# Patient Record
Sex: MALE | Race: BLACK OR AFRICAN AMERICAN | Employment: OTHER | URBAN - METROPOLITAN AREA
[De-identification: names, ages, dates, MRNs, and addresses within clinical notes are randomized per-mention and may not be internally consistent; named-entity substitution may affect disease eponyms.]

---

## 2019-01-03 ENCOUNTER — HOSPITAL ENCOUNTER (OUTPATIENT)
Dept: GENERAL RADIOLOGY | Age: 84
Discharge: HOME | End: 2019-01-03
Payer: MEDICARE

## 2019-01-03 DIAGNOSIS — K59.00 CONSTIPATION: ICD-10-CM

## 2019-01-03 PROCEDURE — 74022 RADEX COMPL AQT ABD SERIES: CPT

## 2021-11-04 ENCOUNTER — HOSPITAL ENCOUNTER (EMERGENCY)
Age: 86
Discharge: HOME | End: 2021-11-04
Attending: EMERGENCY MEDICINE
Payer: MEDICARE

## 2021-11-04 ENCOUNTER — APPOINTMENT (OUTPATIENT)
Dept: CT IMAGING | Age: 86
End: 2021-11-04
Attending: STUDENT IN AN ORGANIZED HEALTH CARE EDUCATION/TRAINING PROGRAM
Payer: MEDICARE

## 2021-11-04 VITALS
RESPIRATION RATE: 16 BRPM | BODY MASS INDEX: 30.62 KG/M2 | HEART RATE: 62 BPM | SYSTOLIC BLOOD PRESSURE: 153 MMHG | OXYGEN SATURATION: 96 % | HEIGHT: 67 IN | WEIGHT: 195.11 LBS | TEMPERATURE: 97.3 F | DIASTOLIC BLOOD PRESSURE: 83 MMHG

## 2021-11-04 DIAGNOSIS — F01.50 VASCULAR DEMENTIA WITHOUT BEHAVIORAL DISTURBANCE (HCC): Primary | ICD-10-CM

## 2021-11-04 LAB
ALBUMIN SERPL-MCNC: 3.9 G/DL (ref 3.5–5)
ALBUMIN/GLOB SERPL: 0.9 {RATIO} (ref 1.1–2.2)
ALP SERPL-CCNC: 73 U/L (ref 45–117)
ALT SERPL-CCNC: 24 U/L (ref 12–78)
ANION GAP SERPL CALC-SCNC: 4 MMOL/L (ref 5–15)
APPEARANCE UR: CLEAR
AST SERPL-CCNC: 19 U/L (ref 15–37)
ATRIAL RATE: 56 BPM
BACTERIA URNS QL MICRO: NEGATIVE /HPF
BASOPHILS # BLD: 0 K/UL (ref 0–0.1)
BASOPHILS NFR BLD: 1 % (ref 0–1)
BILIRUB SERPL-MCNC: 1.2 MG/DL (ref 0.2–1)
BILIRUB UR QL: NEGATIVE
BUN SERPL-MCNC: 15 MG/DL (ref 6–20)
BUN/CREAT SERPL: 10 (ref 12–20)
CALCIUM SERPL-MCNC: 9.9 MG/DL (ref 8.5–10.1)
CALCULATED P AXIS, ECG09: 38 DEGREES
CALCULATED R AXIS, ECG10: -11 DEGREES
CALCULATED T AXIS, ECG11: -14 DEGREES
CHLORIDE SERPL-SCNC: 107 MMOL/L (ref 97–108)
CO2 SERPL-SCNC: 28 MMOL/L (ref 21–32)
COLOR UR: NORMAL
COMMENT, HOLDF: NORMAL
CREAT SERPL-MCNC: 1.44 MG/DL (ref 0.7–1.3)
DIAGNOSIS, 93000: NORMAL
DIFFERENTIAL METHOD BLD: ABNORMAL
EOSINOPHIL # BLD: 0.1 K/UL (ref 0–0.4)
EOSINOPHIL NFR BLD: 2 % (ref 0–7)
EPITH CASTS URNS QL MICRO: NORMAL /LPF
ERYTHROCYTE [DISTWIDTH] IN BLOOD BY AUTOMATED COUNT: 12.9 % (ref 11.5–14.5)
GLOBULIN SER CALC-MCNC: 4.4 G/DL (ref 2–4)
GLUCOSE SERPL-MCNC: 80 MG/DL (ref 65–100)
GLUCOSE UR STRIP.AUTO-MCNC: NEGATIVE MG/DL
HCT VFR BLD AUTO: 47 % (ref 36.6–50.3)
HGB BLD-MCNC: 15.8 G/DL (ref 12.1–17)
HGB UR QL STRIP: NEGATIVE
IMM GRANULOCYTES # BLD AUTO: 0 K/UL (ref 0–0.04)
IMM GRANULOCYTES NFR BLD AUTO: 0 % (ref 0–0.5)
KETONES UR QL STRIP.AUTO: NEGATIVE MG/DL
LEUKOCYTE ESTERASE UR QL STRIP.AUTO: NEGATIVE
LYMPHOCYTES # BLD: 0.9 K/UL (ref 0.8–3.5)
LYMPHOCYTES NFR BLD: 26 % (ref 12–49)
MCH RBC QN AUTO: 31 PG (ref 26–34)
MCHC RBC AUTO-ENTMCNC: 33.6 G/DL (ref 30–36.5)
MCV RBC AUTO: 92.2 FL (ref 80–99)
MONOCYTES # BLD: 0.6 K/UL (ref 0–1)
MONOCYTES NFR BLD: 17 % (ref 5–13)
NEUTS SEG # BLD: 1.9 K/UL (ref 1.8–8)
NEUTS SEG NFR BLD: 54 % (ref 32–75)
NITRITE UR QL STRIP.AUTO: NEGATIVE
NRBC # BLD: 0 K/UL (ref 0–0.01)
NRBC BLD-RTO: 0 PER 100 WBC
P-R INTERVAL, ECG05: 174 MS
PH UR STRIP: 6 [PH] (ref 5–8)
PLATELET # BLD AUTO: 167 K/UL (ref 150–400)
PMV BLD AUTO: 11.9 FL (ref 8.9–12.9)
POTASSIUM SERPL-SCNC: 4 MMOL/L (ref 3.5–5.1)
PROT SERPL-MCNC: 8.3 G/DL (ref 6.4–8.2)
PROT UR STRIP-MCNC: NEGATIVE MG/DL
Q-T INTERVAL, ECG07: 406 MS
QRS DURATION, ECG06: 76 MS
QTC CALCULATION (BEZET), ECG08: 391 MS
RBC # BLD AUTO: 5.1 M/UL (ref 4.1–5.7)
RBC #/AREA URNS HPF: NORMAL /HPF (ref 0–5)
SAMPLES BEING HELD,HOLD: NORMAL
SODIUM SERPL-SCNC: 139 MMOL/L (ref 136–145)
SP GR UR REFRACTOMETRY: <1.005 (ref 1–1.03)
TROPONIN-HIGH SENSITIVITY: 9 NG/L (ref 0–76)
UA: UC IF INDICATED,UAUC: NORMAL
UROBILINOGEN UR QL STRIP.AUTO: 0.2 EU/DL (ref 0.2–1)
VENTRICULAR RATE, ECG03: 56 BPM
WBC # BLD AUTO: 3.5 K/UL (ref 4.1–11.1)
WBC URNS QL MICRO: NORMAL /HPF (ref 0–4)

## 2021-11-04 PROCEDURE — 99283 EMERGENCY DEPT VISIT LOW MDM: CPT

## 2021-11-04 PROCEDURE — 93005 ELECTROCARDIOGRAM TRACING: CPT

## 2021-11-04 PROCEDURE — 70450 CT HEAD/BRAIN W/O DYE: CPT

## 2021-11-04 PROCEDURE — 84484 ASSAY OF TROPONIN QUANT: CPT

## 2021-11-04 PROCEDURE — 80053 COMPREHEN METABOLIC PANEL: CPT

## 2021-11-04 PROCEDURE — 36415 COLL VENOUS BLD VENIPUNCTURE: CPT

## 2021-11-04 PROCEDURE — 81001 URINALYSIS AUTO W/SCOPE: CPT

## 2021-11-04 PROCEDURE — 85025 COMPLETE CBC W/AUTO DIFF WBC: CPT

## 2021-11-04 NOTE — DISCHARGE INSTRUCTIONS
Please follow up with your primary care doctor. We also recommend follow up with Neurology. Please see attached instructions for more information.
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
## 2021-12-28 ENCOUNTER — APPOINTMENT (OUTPATIENT)
Dept: GENERAL RADIOLOGY | Age: 86
DRG: 445 | End: 2021-12-28
Attending: PHYSICIAN ASSISTANT
Payer: MEDICARE

## 2021-12-28 ENCOUNTER — HOSPITAL ENCOUNTER (INPATIENT)
Age: 86
LOS: 3 days | Discharge: HOME | DRG: 445 | End: 2022-01-01
Attending: EMERGENCY MEDICINE | Admitting: INTERNAL MEDICINE
Payer: MEDICARE

## 2021-12-28 ENCOUNTER — APPOINTMENT (OUTPATIENT)
Dept: CT IMAGING | Age: 86
DRG: 445 | End: 2021-12-28
Attending: PHYSICIAN ASSISTANT
Payer: MEDICARE

## 2021-12-28 DIAGNOSIS — R78.81 BACTEREMIA DUE TO GRAM-NEGATIVE BACTERIA: ICD-10-CM

## 2021-12-28 DIAGNOSIS — B17.9 ACUTE HEPATITIS: ICD-10-CM

## 2021-12-28 DIAGNOSIS — E80.6 HYPERBILIRUBINEMIA: Primary | ICD-10-CM

## 2021-12-28 DIAGNOSIS — R50.9 FEVER, UNSPECIFIED FEVER CAUSE: ICD-10-CM

## 2021-12-28 DIAGNOSIS — R77.8 ELEVATED TROPONIN: ICD-10-CM

## 2021-12-28 LAB
ALBUMIN SERPL-MCNC: 3.5 G/DL (ref 3.5–5)
ALBUMIN/GLOB SERPL: 0.9 {RATIO} (ref 1.1–2.2)
ALP SERPL-CCNC: 127 U/L (ref 45–117)
ALT SERPL-CCNC: 734 U/L (ref 12–78)
ANION GAP SERPL CALC-SCNC: 6 MMOL/L (ref 5–15)
AST SERPL-CCNC: 666 U/L (ref 15–37)
BILIRUB SERPL-MCNC: 4.1 MG/DL (ref 0.2–1)
BUN SERPL-MCNC: 19 MG/DL (ref 6–20)
BUN/CREAT SERPL: 15 (ref 12–20)
CALCIUM SERPL-MCNC: 9.1 MG/DL (ref 8.5–10.1)
CHLORIDE SERPL-SCNC: 105 MMOL/L (ref 97–108)
CO2 SERPL-SCNC: 27 MMOL/L (ref 21–32)
COMMENT, HOLDF: NORMAL
COMMENT, HOLDF: NORMAL
CREAT SERPL-MCNC: 1.3 MG/DL (ref 0.7–1.3)
GLOBULIN SER CALC-MCNC: 3.8 G/DL (ref 2–4)
GLUCOSE SERPL-MCNC: 108 MG/DL (ref 65–100)
LACTATE SERPL-SCNC: 0.8 MMOL/L (ref 0.4–2)
POTASSIUM SERPL-SCNC: 4.2 MMOL/L (ref 3.5–5.1)
PROT SERPL-MCNC: 7.3 G/DL (ref 6.4–8.2)
SAMPLES BEING HELD,HOLD: NORMAL
SAMPLES BEING HELD,HOLD: NORMAL
SODIUM SERPL-SCNC: 138 MMOL/L (ref 136–145)

## 2021-12-28 PROCEDURE — 87186 SC STD MICRODIL/AGAR DIL: CPT

## 2021-12-28 PROCEDURE — 83605 ASSAY OF LACTIC ACID: CPT

## 2021-12-28 PROCEDURE — 81003 URINALYSIS AUTO W/O SCOPE: CPT

## 2021-12-28 PROCEDURE — 74177 CT ABD & PELVIS W/CONTRAST: CPT

## 2021-12-28 PROCEDURE — 80053 COMPREHEN METABOLIC PANEL: CPT

## 2021-12-28 PROCEDURE — 71046 X-RAY EXAM CHEST 2 VIEWS: CPT

## 2021-12-28 PROCEDURE — 87077 CULTURE AEROBIC IDENTIFY: CPT

## 2021-12-28 PROCEDURE — 36415 COLL VENOUS BLD VENIPUNCTURE: CPT

## 2021-12-28 PROCEDURE — 85025 COMPLETE CBC W/AUTO DIFF WBC: CPT

## 2021-12-28 PROCEDURE — 81001 URINALYSIS AUTO W/SCOPE: CPT

## 2021-12-28 PROCEDURE — U0005 INFEC AGEN DETEC AMPLI PROBE: HCPCS

## 2021-12-28 PROCEDURE — 87040 BLOOD CULTURE FOR BACTERIA: CPT

## 2021-12-28 PROCEDURE — 99284 EMERGENCY DEPT VISIT MOD MDM: CPT

## 2021-12-29 ENCOUNTER — APPOINTMENT (OUTPATIENT)
Dept: VASCULAR SURGERY | Age: 86
DRG: 445 | End: 2021-12-29
Attending: INTERNAL MEDICINE
Payer: MEDICARE

## 2021-12-29 ENCOUNTER — APPOINTMENT (OUTPATIENT)
Dept: GENERAL RADIOLOGY | Age: 86
DRG: 445 | End: 2021-12-29
Attending: INTERNAL MEDICINE
Payer: MEDICARE

## 2021-12-29 ENCOUNTER — APPOINTMENT (OUTPATIENT)
Dept: ULTRASOUND IMAGING | Age: 86
DRG: 445 | End: 2021-12-29
Attending: INTERNAL MEDICINE
Payer: MEDICARE

## 2021-12-29 PROBLEM — B17.9 ACUTE HEPATITIS: Status: ACTIVE | Noted: 2021-12-29

## 2021-12-29 LAB
ALBUMIN SERPL-MCNC: 3.1 G/DL (ref 3.5–5)
ALBUMIN/GLOB SERPL: 0.8 {RATIO} (ref 1.1–2.2)
ALP SERPL-CCNC: 102 U/L (ref 45–117)
ALT SERPL-CCNC: 486 U/L (ref 12–78)
ANION GAP SERPL CALC-SCNC: 9 MMOL/L (ref 5–15)
APAP SERPL-MCNC: <2 UG/ML (ref 10–30)
APPEARANCE UR: CLEAR
AST SERPL-CCNC: 286 U/L (ref 15–37)
AST SERPL-CCNC: ABNORMAL U/L (ref 15–37)
BACTERIA URNS QL MICRO: NEGATIVE /HPF
BASOPHILS # BLD: 0 K/UL (ref 0–0.1)
BASOPHILS # BLD: 0 K/UL (ref 0–0.1)
BASOPHILS NFR BLD: 0 % (ref 0–1)
BASOPHILS NFR BLD: 0 % (ref 0–1)
BILIRUB SERPL-MCNC: 5.2 MG/DL (ref 0.2–1)
BILIRUB UR QL CFM: POSITIVE
BNP SERPL-MCNC: 1314 PG/ML
BUN SERPL-MCNC: 18 MG/DL (ref 6–20)
BUN/CREAT SERPL: 13 (ref 12–20)
CALCIUM SERPL-MCNC: 8.8 MG/DL (ref 8.5–10.1)
CHLORIDE SERPL-SCNC: 103 MMOL/L (ref 97–108)
CO2 SERPL-SCNC: 23 MMOL/L (ref 21–32)
COLOR UR: ABNORMAL
COVID-19 RAPID TEST, COVR: NOT DETECTED
CREAT SERPL-MCNC: 1.37 MG/DL (ref 0.7–1.3)
CRP SERPL-MCNC: 10.6 MG/DL (ref 0–0.6)
DIFFERENTIAL METHOD BLD: ABNORMAL
DIFFERENTIAL METHOD BLD: ABNORMAL
EOSINOPHIL # BLD: 0 K/UL (ref 0–0.4)
EOSINOPHIL # BLD: 0.1 K/UL (ref 0–0.4)
EOSINOPHIL NFR BLD: 0 % (ref 0–7)
EOSINOPHIL NFR BLD: 1 % (ref 0–7)
EPITH CASTS URNS QL MICRO: ABNORMAL /LPF
ERYTHROCYTE [DISTWIDTH] IN BLOOD BY AUTOMATED COUNT: 12.9 % (ref 11.5–14.5)
ERYTHROCYTE [DISTWIDTH] IN BLOOD BY AUTOMATED COUNT: 13.3 % (ref 11.5–14.5)
FOLATE SERPL-MCNC: 12.5 NG/ML (ref 5–21)
GLOBULIN SER CALC-MCNC: 3.7 G/DL (ref 2–4)
GLUCOSE SERPL-MCNC: 93 MG/DL (ref 65–100)
GLUCOSE UR STRIP.AUTO-MCNC: NEGATIVE MG/DL
HAV IGM SER QL: NONREACTIVE
HBV CORE IGM SER QL: NONREACTIVE
HBV SURFACE AG SER QL: 0.24 INDEX
HBV SURFACE AG SER QL: NEGATIVE
HCT VFR BLD AUTO: 41.3 % (ref 36.6–50.3)
HCT VFR BLD AUTO: 43.9 % (ref 36.6–50.3)
HCV AB SERPL QL IA: NONREACTIVE
HGB BLD-MCNC: 14.3 G/DL (ref 12.1–17)
HGB BLD-MCNC: 15.1 G/DL (ref 12.1–17)
HGB UR QL STRIP: NEGATIVE
HYALINE CASTS URNS QL MICRO: ABNORMAL /LPF (ref 0–5)
IMM GRANULOCYTES # BLD AUTO: 0 K/UL (ref 0–0.04)
IMM GRANULOCYTES # BLD AUTO: 0 K/UL (ref 0–0.04)
IMM GRANULOCYTES NFR BLD AUTO: 0 % (ref 0–0.5)
IMM GRANULOCYTES NFR BLD AUTO: 0 % (ref 0–0.5)
INR PPP: 1.5 (ref 0.9–1.1)
KETONES UR QL STRIP.AUTO: ABNORMAL MG/DL
LEUKOCYTE ESTERASE UR QL STRIP.AUTO: ABNORMAL
LIPASE SERPL-CCNC: 65 U/L (ref 73–393)
LYMPHOCYTES # BLD: 0.4 K/UL (ref 0.8–3.5)
LYMPHOCYTES # BLD: 0.5 K/UL (ref 0.8–3.5)
LYMPHOCYTES NFR BLD: 4 % (ref 12–49)
LYMPHOCYTES NFR BLD: 7 % (ref 12–49)
MAGNESIUM SERPL-MCNC: 2.2 MG/DL (ref 1.6–2.4)
MAGNESIUM SERPL-MCNC: NORMAL MG/DL (ref 1.6–2.4)
MCH RBC QN AUTO: 30.8 PG (ref 26–34)
MCH RBC QN AUTO: 31.1 PG (ref 26–34)
MCHC RBC AUTO-ENTMCNC: 34.4 G/DL (ref 30–36.5)
MCHC RBC AUTO-ENTMCNC: 34.6 G/DL (ref 30–36.5)
MCV RBC AUTO: 88.8 FL (ref 80–99)
MCV RBC AUTO: 90.3 FL (ref 80–99)
MONOCYTES # BLD: 0.6 K/UL (ref 0–1)
MONOCYTES # BLD: 1 K/UL (ref 0–1)
MONOCYTES NFR BLD: 11 % (ref 5–13)
MONOCYTES NFR BLD: 9 % (ref 5–13)
NEUTS SEG # BLD: 6 K/UL (ref 1.8–8)
NEUTS SEG # BLD: 8.1 K/UL (ref 1.8–8)
NEUTS SEG NFR BLD: 83 % (ref 32–75)
NEUTS SEG NFR BLD: 85 % (ref 32–75)
NITRITE UR QL STRIP.AUTO: NEGATIVE
NRBC # BLD: 0 K/UL (ref 0–0.01)
NRBC # BLD: 0 K/UL (ref 0–0.01)
NRBC BLD-RTO: 0 PER 100 WBC
NRBC BLD-RTO: 0 PER 100 WBC
PH UR STRIP: 6 [PH] (ref 5–8)
PHOSPHATE SERPL-MCNC: 2.2 MG/DL (ref 2.6–4.7)
PLATELET # BLD AUTO: 108 K/UL (ref 150–400)
PLATELET # BLD AUTO: 137 K/UL (ref 150–400)
PMV BLD AUTO: 12.2 FL (ref 8.9–12.9)
PMV BLD AUTO: 12.2 FL (ref 8.9–12.9)
POTASSIUM SERPL-SCNC: 4.3 MMOL/L (ref 3.5–5.1)
POTASSIUM SERPL-SCNC: ABNORMAL MMOL/L (ref 3.5–5.1)
PROT SERPL-MCNC: 6.8 G/DL (ref 6.4–8.2)
PROT UR STRIP-MCNC: ABNORMAL MG/DL
PROTHROMBIN TIME: 15.3 SEC (ref 9–11.1)
RBC # BLD AUTO: 4.65 M/UL (ref 4.1–5.7)
RBC # BLD AUTO: 4.86 M/UL (ref 4.1–5.7)
RBC #/AREA URNS HPF: ABNORMAL /HPF (ref 0–5)
RBC MORPH BLD: ABNORMAL
RBC MORPH BLD: ABNORMAL
SODIUM SERPL-SCNC: 135 MMOL/L (ref 136–145)
SOURCE, COVRS: NORMAL
SP GR UR REFRACTOMETRY: 1.02 (ref 1–1.03)
SP1: NORMAL
SP2: NORMAL
SP3: NORMAL
TROPONIN-HIGH SENSITIVITY: 324 NG/L (ref 0–76)
TROPONIN-HIGH SENSITIVITY: 427 NG/L (ref 0–76)
TSH SERPL DL<=0.05 MIU/L-ACNC: 0.52 UIU/ML (ref 0.36–3.74)
UROBILINOGEN UR QL STRIP.AUTO: 1 EU/DL (ref 0.2–1)
VIT B12 SERPL-MCNC: 425 PG/ML (ref 193–986)
WBC # BLD AUTO: 7.2 K/UL (ref 4.1–11.1)
WBC # BLD AUTO: 9.5 K/UL (ref 4.1–11.1)
WBC URNS QL MICRO: ABNORMAL /HPF (ref 0–4)

## 2021-12-29 PROCEDURE — 85610 PROTHROMBIN TIME: CPT

## 2021-12-29 PROCEDURE — 84443 ASSAY THYROID STIM HORMONE: CPT

## 2021-12-29 PROCEDURE — 74011250637 HC RX REV CODE- 250/637: Performed by: INTERNAL MEDICINE

## 2021-12-29 PROCEDURE — 93970 EXTREMITY STUDY: CPT

## 2021-12-29 PROCEDURE — 85025 COMPLETE CBC W/AUTO DIFF WBC: CPT

## 2021-12-29 PROCEDURE — 86140 C-REACTIVE PROTEIN: CPT

## 2021-12-29 PROCEDURE — 83735 ASSAY OF MAGNESIUM: CPT

## 2021-12-29 PROCEDURE — 83880 ASSAY OF NATRIURETIC PEPTIDE: CPT

## 2021-12-29 PROCEDURE — 87635 SARS-COV-2 COVID-19 AMP PRB: CPT

## 2021-12-29 PROCEDURE — 74011250636 HC RX REV CODE- 250/636: Performed by: INTERNAL MEDICINE

## 2021-12-29 PROCEDURE — 82746 ASSAY OF FOLIC ACID SERUM: CPT

## 2021-12-29 PROCEDURE — 76705 ECHO EXAM OF ABDOMEN: CPT

## 2021-12-29 PROCEDURE — 99223 1ST HOSP IP/OBS HIGH 75: CPT | Performed by: STUDENT IN AN ORGANIZED HEALTH CARE EDUCATION/TRAINING PROGRAM

## 2021-12-29 PROCEDURE — 65270000029 HC RM PRIVATE

## 2021-12-29 PROCEDURE — APPSS45 APP SPLIT SHARED TIME 31-45 MINUTES: Performed by: NURSE PRACTITIONER

## 2021-12-29 PROCEDURE — 80143 DRUG ASSAY ACETAMINOPHEN: CPT

## 2021-12-29 PROCEDURE — 74011000250 HC RX REV CODE- 250: Performed by: INTERNAL MEDICINE

## 2021-12-29 PROCEDURE — 84450 TRANSFERASE (AST) (SGOT): CPT

## 2021-12-29 PROCEDURE — 74011000636 HC RX REV CODE- 636: Performed by: RADIOLOGY

## 2021-12-29 PROCEDURE — 80053 COMPREHEN METABOLIC PANEL: CPT

## 2021-12-29 PROCEDURE — 84100 ASSAY OF PHOSPHORUS: CPT

## 2021-12-29 PROCEDURE — 83690 ASSAY OF LIPASE: CPT

## 2021-12-29 PROCEDURE — 51798 US URINE CAPACITY MEASURE: CPT

## 2021-12-29 PROCEDURE — 84132 ASSAY OF SERUM POTASSIUM: CPT

## 2021-12-29 PROCEDURE — 82607 VITAMIN B-12: CPT

## 2021-12-29 PROCEDURE — 36415 COLL VENOUS BLD VENIPUNCTURE: CPT

## 2021-12-29 PROCEDURE — 84484 ASSAY OF TROPONIN QUANT: CPT

## 2021-12-29 PROCEDURE — 80074 ACUTE HEPATITIS PANEL: CPT

## 2021-12-29 RX ORDER — ROSUVASTATIN CALCIUM 5 MG/1
5 TABLET, COATED ORAL
COMMUNITY
End: 2022-01-01

## 2021-12-29 RX ORDER — HEPARIN SODIUM 5000 [USP'U]/ML
5000 INJECTION, SOLUTION INTRAVENOUS; SUBCUTANEOUS EVERY 8 HOURS
Status: DISCONTINUED | OUTPATIENT
Start: 2021-12-29 | End: 2022-01-01

## 2021-12-29 RX ORDER — POLYETHYLENE GLYCOL 3350 17 G/17G
17 POWDER, FOR SOLUTION ORAL DAILY PRN
Status: DISCONTINUED | OUTPATIENT
Start: 2021-12-29 | End: 2022-01-01 | Stop reason: HOSPADM

## 2021-12-29 RX ORDER — VALSARTAN 80 MG/1
160 TABLET ORAL DAILY
Status: DISCONTINUED | OUTPATIENT
Start: 2021-12-29 | End: 2022-01-01 | Stop reason: HOSPADM

## 2021-12-29 RX ORDER — GUAIFENESIN 100 MG/5ML
81 LIQUID (ML) ORAL DAILY
Status: DISCONTINUED | OUTPATIENT
Start: 2021-12-29 | End: 2022-01-01 | Stop reason: HOSPADM

## 2021-12-29 RX ORDER — GUAIFENESIN 100 MG/5ML
81 LIQUID (ML) ORAL DAILY
COMMUNITY
End: 2022-05-04

## 2021-12-29 RX ORDER — METOPROLOL TARTRATE 25 MG/1
25 TABLET, FILM COATED ORAL 2 TIMES DAILY
Status: DISCONTINUED | OUTPATIENT
Start: 2021-12-29 | End: 2022-01-01 | Stop reason: HOSPADM

## 2021-12-29 RX ORDER — SODIUM CHLORIDE 0.9 % (FLUSH) 0.9 %
5-40 SYRINGE (ML) INJECTION EVERY 8 HOURS
Status: DISCONTINUED | OUTPATIENT
Start: 2021-12-29 | End: 2022-01-01 | Stop reason: HOSPADM

## 2021-12-29 RX ORDER — BISACODYL 5 MG
10 TABLET, DELAYED RELEASE (ENTERIC COATED) ORAL DAILY
Status: DISCONTINUED | OUTPATIENT
Start: 2021-12-30 | End: 2022-01-01 | Stop reason: HOSPADM

## 2021-12-29 RX ORDER — ONDANSETRON 4 MG/1
4 TABLET, ORALLY DISINTEGRATING ORAL
Status: DISCONTINUED | OUTPATIENT
Start: 2021-12-29 | End: 2022-01-01 | Stop reason: HOSPADM

## 2021-12-29 RX ORDER — SODIUM CHLORIDE 0.9 % (FLUSH) 0.9 %
5-40 SYRINGE (ML) INJECTION AS NEEDED
Status: DISCONTINUED | OUTPATIENT
Start: 2021-12-29 | End: 2022-01-01 | Stop reason: HOSPADM

## 2021-12-29 RX ORDER — BISACODYL 5 MG
10 TABLET, DELAYED RELEASE (ENTERIC COATED) ORAL
Status: COMPLETED | OUTPATIENT
Start: 2021-12-29 | End: 2021-12-29

## 2021-12-29 RX ORDER — POLYETHYLENE GLYCOL 3350 17 G/17G
17 POWDER, FOR SOLUTION ORAL DAILY
Status: DISCONTINUED | OUTPATIENT
Start: 2021-12-30 | End: 2022-01-01 | Stop reason: HOSPADM

## 2021-12-29 RX ORDER — ONDANSETRON 2 MG/ML
4 INJECTION INTRAMUSCULAR; INTRAVENOUS
Status: DISCONTINUED | OUTPATIENT
Start: 2021-12-29 | End: 2022-01-01 | Stop reason: HOSPADM

## 2021-12-29 RX ORDER — VALSARTAN 160 MG/1
160 TABLET ORAL DAILY
COMMUNITY
End: 2022-06-15

## 2021-12-29 RX ORDER — METOPROLOL TARTRATE 25 MG/1
25 TABLET, FILM COATED ORAL 2 TIMES DAILY
COMMUNITY

## 2021-12-29 RX ADMIN — HEPARIN SODIUM 5000 UNITS: 5000 INJECTION INTRAVENOUS; SUBCUTANEOUS at 13:55

## 2021-12-29 RX ADMIN — VALSARTAN 160 MG: 80 TABLET, FILM COATED ORAL at 10:30

## 2021-12-29 RX ADMIN — METOPROLOL TARTRATE 25 MG: 25 TABLET, FILM COATED ORAL at 10:30

## 2021-12-29 RX ADMIN — BISACODYL 10 MG: 5 TABLET, COATED ORAL at 20:17

## 2021-12-29 RX ADMIN — METOPROLOL TARTRATE 25 MG: 25 TABLET, FILM COATED ORAL at 18:21

## 2021-12-29 RX ADMIN — ASPIRIN 81 MG: 81 TABLET, CHEWABLE ORAL at 10:30

## 2021-12-29 RX ADMIN — Medication 10 ML: at 23:16

## 2021-12-29 RX ADMIN — HEPARIN SODIUM 5000 UNITS: 5000 INJECTION INTRAVENOUS; SUBCUTANEOUS at 23:10

## 2021-12-29 RX ADMIN — Medication 10 ML: at 13:57

## 2021-12-29 RX ADMIN — BISACODYL 10 MG: 5 TABLET, COATED ORAL at 16:39

## 2021-12-29 RX ADMIN — IOPAMIDOL 100 ML: 755 INJECTION, SOLUTION INTRAVENOUS at 00:02

## 2021-12-29 RX ADMIN — BISACODYL 10 MG: 5 TABLET, COATED ORAL at 18:21

## 2021-12-29 RX ADMIN — WATER 2 G: 1 INJECTION INTRAMUSCULAR; INTRAVENOUS; SUBCUTANEOUS at 20:17

## 2021-12-29 NOTE — H&P
[facility] [facility]  HISTORY AND PHYSICAL    Name:  Vivi Chaudhry  MR#:  [id number]  :  1933  ACCOUNT #:  [de-identified]  ADMIT DATE:  2021      The patient was seen, evaluated, and admitted by me on 2021. PRIMARY CARE PHYSICIAN:  Doug Wick MD    SOURCE OF INFORMATION:  The patient who is not a good historian because of suspected underlying dementia, the patient's daughter who was present at the bedside, and review of ED electronic medical records. CHIEF COMPLAINT:  Urinary incontinence. HISTORY OF PRESENT ILLNESS:  This is an 44-year-old man with a past medical history significant for dyslipidemia, hypertension, dementia, who was in his usual state of health until about 3 weeks ago when the patient developed urinary incontinence. The patient was brought to the emergency room for further evaluation. On the day of presentation at the emergency room, the patient also has fever as well as abdominal pain. The abdominal pain is located at the right lower quadrant associated with 2 episodes of vomiting and decreased oral intake and reduced appetite. The patient is not able to state the severity of the abdominal pain, but it is constant, dull ache. When the patient arrived at the emergency room, his lab work shows significant abnormal liver function tests. CT scan of the abdomen and pelvis was obtained, which shows no acute intracranial process. The patient was subsequently referred to the hospitalist service for evaluation for admission. No prior history of liver disease and no record of prior admission to this hospital.    PAST MEDICAL HISTORY:  Hypertension, dyslipidemia, dementia, prostate cancer status post radiation therapy. ALLERGIES:  NO KNOWN DRUG ALLERGIES. MEDICATIONS:  1. Aspirin 81 mg daily. 2.  Lopressor 25 mg twice daily. 3.  Crestor 5 mg daily at bedtime. 4.  Diovan 160 mg daily. FAMILY HISTORY:  This was reviewed.   His father has hypertension. PAST SURGICAL HISTORY:  Not significant. SOCIAL HISTORY:  No history of alcohol or tobacco abuse. REVIEW OF SYSTEMS:  HEAD, EYES, EARS, NOSE, AND THROAT:  No headache, no dizziness, no blurring of vision, no photophobia. RESPIRATORY SYSTEM:  No cough, no shortness of breath, no hemoptysis. CARDIOVASCULAR SYSTEM:  No chest pain, no orthopnea, no palpitation. GASTROINTESTINAL SYSTEM:  This is positive for abdominal pain, nausea, and vomiting. No constipation. GENITOURINARY SYSTEM:  No dysuria, no urgency, and no frequency. All other systems are reviewed and they are negative. PHYSICAL EXAMINATION:  GENERAL APPEARANCE:  The patient appeared ill, in moderate distress. VITAL SIGNS:  On arrival at the emergency room, temperature 98.8, pulse 65, respiratory rate 17, blood pressure 146/89, oxygen saturation 95% on room air. HEENT:  Head:  Normocephalic, atraumatic. Eyes:  Normal eye movement. No redness, no drainage, no discharge. Ears:  Normal external ears with no evidence of drainage. Nose:  No deformity and no drainage. Mouth and Throat:  No visible oral lesion. Dry oral mucosa. NECK:  Neck is supple. No JVD, no thyromegaly. CHEST:  Clear breath sounds. No wheezing, no crackles. HEART:  Normal S1 and S2, regular. No clinically appreciable murmur. ABDOMEN:  Soft, nontender. Normal bowel sounds. CNS:  Alert, oriented to person and place. No gross focal neurological deficit. EXTREMITIES:  Edema 2+. Pulses 2+ bilaterally. MUSCULOSKELETAL SYSTEM:  No evidence of joint deformity or swelling. SKIN:  No active skin lesions seen in the exposed part of the body. PSYCHIATRY:  Unable to assess mood and affect. LYMPHATIC SYSTEM:  No cervical lymphadenopathy. DIAGNOSTIC DATA:  CT scan of the abdomen and pelvis, no acute intracranial process identified. LABORATORY DATA:  Lactic acid level 0.8.   Chemistry:  Sodium 138, potassium 4.2, chloride 105, CO2 27, glucose 108, BUN 19, creatinine 1.30, calcium 9.1, total bilirubin 4.1, , , alkaline phosphatase 127, total protein 7.3, albumin level 3.5, globulin at 3.8. Hematology:  WBC 7.2, hemoglobin at 15.1, hematocrit 43.9, platelets 442. Urinalysis: This is significant for negative nitrite, small leukocyte esterase, negative bacteria. COVID-19 rapid test, not detected. ASSESSMENT:  1. Acute hepatitis. 2.  Dyslipidemia. 3.  Hypertension. 4.  Dementia. 5.  Prostate cancer, status post radiation therapy. 6.  Bilateral leg swelling. 7.  Thrombocytopenia. PLAN:  1. Acute hepatitis. We will admit the patient for further evaluation and treatment. We will check lipase level. We will check abdominal ultrasound. We will check acute hepatitis panel. Gastroenterology consult will be requested to assist in further evaluation and treatment. We will hold Crestor. The etiology of the acute pancreatitis is not clear at this time. We will check acetaminophen level. We will also check PT, INR level. We will await further recommendation from the gastroenterologist.  2.  Dyslipidemia. We will continue with dietary therapy. We will hold Crestor, because of the acute hepatitis. 3.  Hypertension. We will resume preadmission medication. We will monitor the patient's blood pressure closely. 4.  Dementia. We will continue supportive treatment. We will check Z74 and folic acid level. 5.  Prostate cancer, status post radiation therapy. The patient has a regular followup appointment with his urologist and this prostate cancer is in remission. 6.  Bilateral leg swelling. We will check ultrasound of the lower extremity for DVT. We will check BNP level as well as chest x-ray to evaluate the patient for vascular congestion. 7.  Thrombocytopenia. This is mild. We will monitor the patient's platelet count closely. 8.  Other Issues:  Code Status: The patient is a full code.   We will place the patient on heparin for DVT prophylaxis. If there is further significant drop in the patient's platelet count, we will discontinue heparin and request SCD for DVT prophylaxis. FUNCTIONAL STATUS PRIOR TO ADMISSION:  The patient came from home. The patient is ambulatory with assistive device. COVID PRECAUTION:  The patient was wearing a face mask. I was wearing a face mask and gloves for this patient's encounter.       Chan Montes MD      [id number]  D:  12/29/2021 5:20  T:  12/29/2021 6:45  JOB #:  [id number]  CC:  Ethan Zaldivar MD

## 2021-12-29 NOTE — PROGRESS NOTES
Cardiology Initial Care Encounter    Patient: Jayme Dong MRN: [id number]     YOB: 1933  Age: 80 y.o. Sex: male      Admit Date: 12/28/2021       Assessment/Plan     1 Non ACS elevated troponin: 324, 427. in the absence of complaints of chest pains. EKG pending. Would trend enzymes. Ck ECHO. Noted to have coronary vascular calcifications on CT. Can consider OP work up when other acute issues resolve if family desires ischemic evaluation. There is an ECHO from Dr Argelia Dyer office in care everywhere from 8/2020 with LVEF 50% mod AR, mild TR. Will check OS records. 2. Transaminitis: GI consult pending. 3 Hx HTN: cont home meds. 4 Hx HLD: hold statin given inc LFT's   5 Hx dementia:   6 thrombocytopenia:            HPI   Hx obtained from chart 2/2 pt's dementia, no family present. Jayme Dong is a 80 y.o. AA  male  with PMH of HTN, HLD, dementia who was in his usual state of health until about 3 weeks ago when the patient developed urinary incontinence. The patient was brought to the emergency room for further evaluation. On the day of presentation at the emergency room, he had fever as well as abdominal pain. The abdominal pain is located at the right lower quadrant associated with 2 episodes of vomiting and decreased oral intake and reduced appetite. Labs showed transaminitis. CT scan of the abdomen and pelvis was obtained, which shows no acute intraperitoneal issues. Coronary vascular calcifications. Cardiomegaly. Abd US showed limited examination due to overlying bowel gas. Small amount of sludge in the gallbladder.         PAST MEDICAL HISTORY:  Hypertension, dyslipidemia, dementia, prostate cancer status post radiation therapy. The patient denies chest pain, dependent edema, diaphoresis, TORRES, orthopnea, palpitations, PND, shortness of breath, claudication or syncope. No bleeding.        The patient has been referred to cardiology for on going management of elevated troponin        Review of Symptoms: \" I'm fine. I don't know why I am here\"     Constitutional:   ENT: [place]   Respiratory: denies TORRES   Gastrointestinal: no vomiting   Genitourinary:   Musculoskeletal:  Neurological: + memory issues        Previous cardiac hx  04/22/2020 Transthoracic Echocardiography  Notes:  Dr. Ezio Shabazz - EF 55% - Mild to Moderate Aortic Regurgitation and Mild Tricuspid Regurgitation       Risk factors: Male  HTN  HLD      Social History     Tobacco Use    Smoking status: Not on file    Smokeless tobacco: Not on file   Substance Use Topics    Alcohol use: Not on file     No family history on file. Current Facility-Administered Medications   Medication Dose Route Frequency    aspirin chewable tablet 81 mg  81 mg Oral DAILY    metoprolol tartrate (LOPRESSOR) tablet 25 mg  25 mg Oral BID    valsartan (DIOVAN) tablet 160 mg  160 mg Oral DAILY    sodium chloride (NS) flush 5-40 mL  5-40 mL IntraVENous Q8H    sodium chloride (NS) flush 5-40 mL  5-40 mL IntraVENous PRN    polyethylene glycol (MIRALAX) packet 17 g  17 g Oral DAILY PRN    ondansetron (ZOFRAN ODT) tablet 4 mg  4 mg Oral Q8H PRN    Or    ondansetron (ZOFRAN) injection 4 mg  4 mg IntraVENous Q6H PRN    heparin (porcine) injection 5,000 Units  5,000 Units SubCUTAneous Q8H     Current Outpatient Medications   Medication Sig    valsartan (DIOVAN) 160 mg tablet Take 160 mg by mouth daily.  metoprolol tartrate (LOPRESSOR) 25 mg tablet Take 25 mg by mouth two (2) times a day.  rosuvastatin (CRESTOR) 5 mg tablet Take 5 mg by mouth nightly.  aspirin 81 mg chewable tablet Take 81 mg by mouth daily.        Objective:     Vitals:    12/29/21 0800 12/29/21 0900 12/29/21 1000 12/29/21 1200   BP: 132/80 (!) 147/91 (!) 148/88 (!) 142/86   Pulse: 74 71 73 73   Resp: 30 30 30 (!) 32   Temp:  98.6 °F (37 °C)  99.1 °F (37.3 °C)   SpO2:   92% 92%   Weight:            Intake and Output:  Current Shift: No intake/output data recorded. Last three shifts: No intake/output data recorded. Gen: Well-developed, well-nourished, in no acute distress  Neck: Supple,No JVD, No Carotid Bruit,   Resp: No accessory muscle use, Clear breath sounds, No rales or rhonchi  Card: Regular Rate,Rythm,Normal S1, S2, No murmurs, rubs or gallop. No thrills.    Abd:  Soft, non-tender, non-distended,BS+,   MSK: No cyanosis  Skin: No rashes    Neuro: moving all four extremities , follows commands appropriately  Psych:  Good insight, oriented to person, place , alert, Nml Affect  LE: No edema    EKG: pending   TELEMETRY: SR     Lab/Data Review:  CMP:   Lab Results   Component Value Date/Time     (L) 12/29/2021 09:21 AM    K 4.3 12/29/2021 11:45 AM     12/29/2021 09:21 AM    CO2 23 12/29/2021 09:21 AM    AGAP 9 12/29/2021 09:21 AM    GLU 93 12/29/2021 09:21 AM    BUN 18 12/29/2021 09:21 AM    CREA 1.37 (H) 12/29/2021 09:21 AM    GFRAA 59 (L) 12/29/2021 09:21 AM    GFRNA 49 (L) 12/29/2021 09:21 AM    CA 8.8 12/29/2021 09:21 AM    MG 2.2 12/29/2021 11:45 AM    PHOS 2.2 (L) 12/29/2021 09:21 AM    ALB 3.1 (L) 12/29/2021 09:21 AM    TP 6.8 12/29/2021 09:21 AM    GLOB 3.7 12/29/2021 09:21 AM    AGRAT 0.8 (L) 12/29/2021 09:21 AM     (H) 12/29/2021 09:21 AM     CBC:   Lab Results   Component Value Date/Time    WBC 9.5 12/29/2021 11:45 AM    HGB 14.3 12/29/2021 11:45 AM    HCT 41.3 12/29/2021 11:45 AM     (L) 12/29/2021 11:45 AM     All Cardiac Markers in the last 24 hours: No results found for: CPK, CK, CKMMB, CKMB, RCK3, CKMBT, CKNDX, CKND1, YESSICA, TROPT, TROIQ, KENDALL, TROPT, TNIPOC, BNP, BNPP     Signed By: Stephanie Simental NP     December 29, 2021

## 2021-12-29 NOTE — ED PROVIDER NOTES
History of hypertension, hyperlipidemia, dementia. He presents accompanied by his daughter who gives most of the history. She states that he has had problems with urinary incontinence for the past 2 to 3 weeks. She has noted what she suspects is an odor to his urine. Today he developed a fever to 101 with chills. He complained of right lower quadrant abdominal pain earlier but not now. He had 2 episodes of vomiting. He feels weak and his appetite has been decreased. He took Tylenol about 4 hours ago. He has been vaccinated and boosted for Covid. No past medical history on file. No past surgical history on file. No family history on file. Social History     Socioeconomic History    Marital status:      Spouse name: Not on file    Number of children: Not on file    Years of education: Not on file    Highest education level: Not on file   Occupational History    Not on file   Tobacco Use    Smoking status: Not on file    Smokeless tobacco: Not on file   Substance and Sexual Activity    Alcohol use: Not on file    Drug use: Not on file    Sexual activity: Not on file   Other Topics Concern    Not on file   Social History Narrative    Not on file     Social Determinants of Health     Financial Resource Strain:     Difficulty of Paying Living Expenses: Not on file   Food Insecurity:     Worried About Running Out of Food in the Last Year: Not on file    Skyla of Food in the Last Year: Not on file   Transportation Needs:     Lack of Transportation (Medical): Not on file    Lack of Transportation (Non-Medical):  Not on file   Physical Activity:     Days of Exercise per Week: Not on file    Minutes of Exercise per Session: Not on file   Stress:     Feeling of Stress : Not on file   Social Connections:     Frequency of Communication with Friends and Family: Not on file    Frequency of Social Gatherings with Friends and Family: Not on file    Attends Anabaptism Services: Not on file    Active Member of Clubs or Organizations: Not on file    Attends Club or Organization Meetings: Not on file    Marital Status: Not on file   Intimate Partner Violence:     Fear of Current or Ex-Partner: Not on file    Emotionally Abused: Not on file    Physically Abused: Not on file    Sexually Abused: Not on file   Housing Stability:     Unable to Pay for Housing in the Last Year: Not on file    Number of [place] in the Last Year: Not on file    Unstable Housing in the Last Year: Not on file         ALLERGIES: Patient has no known allergies. Review of Systems   All other systems reviewed and are negative. Vitals:    12/28/21 2040 12/28/21 2134   BP: (!) 147/89 132/82   Pulse: 65 69   Resp: 17 16   Temp: 98.8 °F (37.1 °C) 98 °F (36.7 °C)   SpO2: 95% 97%   Weight: 99.8 kg (220 lb)             Physical Exam  Vitals and nursing note reviewed. Constitutional:       Appearance: He is well-developed. Comments: Elderly, limited historian. HENT:      Head: Normocephalic and atraumatic. Eyes:      Conjunctiva/sclera: Conjunctivae normal.   Neck:      Trachea: No tracheal deviation. Cardiovascular:      Rate and Rhythm: Normal rate and regular rhythm. Heart sounds: Normal heart sounds. No murmur heard. No friction rub. No gallop. Pulmonary:      Effort: Pulmonary effort is normal.      Breath sounds: Normal breath sounds. Abdominal:      Palpations: Abdomen is soft. Tenderness: There is no abdominal tenderness. Musculoskeletal:         General: No deformity. Cervical back: Neck supple. Skin:     General: Skin is warm and dry. Neurological:      Mental Status: He is alert.       Comments:             [facility]       Procedures    Perfect Serve Consult for Admission  12:29 AM    ED Room Number: CW/CW  Patient Name and age:  Diana Morrison 80 y.o.  male  Working Diagnosis: Fever/hyperbilirubinemia  COVID-19 Suspicion:  no  Sepsis present:  no Reassessment needed: no  Code Status:  Full Code  Readmission: no  Isolation Requirements:  no  Recommended Level of Care:  med/surg  Department:[facility] ED - [phone]  Other: Presents with urinary incontinence for the past 2 weeks. Today he developed a fever to 101 with chills. He complained of right-sided abdominal pain earlier but not now. 2 episodes of vomiting. Decreased appetite. Work-up remarkable for bilirubin of 4.1 and ALT/AST of 734/666. CT abdomen read as no acute process. Consult note: Dr. Jennifer Lake - beronica admit.   Radha Ward MD

## 2021-12-29 NOTE — ED NOTES
Pt attempt to climb OOB. Pt states \"I want to go home. Get me out of here. \" A,Ox2, pleasantly confused. Easily redirected and assisted up in bed. Bed alarm placed, door left open for closer monitoring. Bed in low and locked position. Will continue to monitor.

## 2021-12-29 NOTE — PROGRESS NOTES
Lana Navas MD notified via message regarding elevated BP and Temp. Awaiting for response. Patient has no complaints at this time.

## 2021-12-29 NOTE — PROGRESS NOTES
Dr Madhu Moses notified of [phone] and [phone] elevated troponin results. Initial [id number] labs hemolyzed, labs were redrawn. Dr Madhu Moses placed cardiology consult.

## 2021-12-29 NOTE — PROGRESS NOTES
12/29/2021  12:54 PM  Case management note    Reason for Admission:  Acute hepatitis    Patient came to ED for abdominal pain and fever. Patient lives alone with much family support. Patient is independent and drives. Patient has history of HTN, HLD and dementia. They have appointment with neurologist on Monday. Miriam @ [address]                      RUR Score:      11%               Plan for utilizing home health:      PT/OT to eval    PCP: First and Last name:  Althea Landis MD     Name of Practice:    Are you a current patient: Yes/No: yes   Approximate date of last visit: OCT   Can you participate in a virtual visit with your PCP:                     Current Advanced Directive/Advance Care Plan: Full Code AD on file      Healthcare Decision Maker:   Torin Jen  daughter                             Transition of Care Plan:             1. Home with family assistance  2. PCP follow up  3.  CM to follow for discharge needs    Care Management Interventions  PCP Verified by CM:  Karen Lee MD)  Support Systems: Child(peterson)  Confirm Follow Up Transport: Family  The Plan for Transition of Care is Related to the Following Treatment Goals : acute hepatitis  Discharge Location  Discharge Placement: Home with family assistance  Adelina Ziegler

## 2021-12-29 NOTE — ED TRIAGE NOTES
Patient arrives to ED with complaints of fever, vomiting, weakness and urinary incontinence     Tylenol administered for fever     Covid vaccinated

## 2021-12-29 NOTE — ED NOTES
Patient with fever and abdominal pain with urinary incontinence. 8:38 PM  I have evaluated the patient as the Provider in Triage. I have reviewed His vital signs and the triage nurse assessment. I have talked with the patient and any available family and advised that I am the provider in triage and have ordered the appropriate study to initiate their work up based on the clinical presentation during my assessment. I have advised that the patient will be accommodated in the Main ED as soon as possible. I have also requested to contact the triage nurse or myself immediately if the patient experiences any changes in their condition during this brief waiting period.   FAVIAN Cannon

## 2021-12-29 NOTE — CONSULTS
Gastroenterology Consult     Referring Physician: Ady West    Consult Date: 12/29/2021     Subjective:  pain     Chief Complaint: pain    History of Present Illness: Sam Butt is a 80 y.o. male who is seen in consultation for liver enzyme abn. Patient is an inadequate historian because of history of dementia; in fact provides little if any meaningful history. Therefore information is obtained from chart materials. On November 4 his CMP was normal with normal liver enzymes. He now presents with evidence for cardiac event and liver enzyme abnormalities. Evaluation otherwise includes ultrasound showing biliary sludge without biliary ductal abnormality without gallbladder wall thickening or fluid collection. CT scan shows no obvious pancreatic inflammation, mass lesion, evidence for liver cirrhosis, malignancy. No past medical history on file. No past surgical history on file. No family history on file. Social History     Tobacco Use    Smoking status: Not on file    Smokeless tobacco: Not on file   Substance Use Topics    Alcohol use: Not on file      No Known Allergies  Current Facility-Administered Medications   Medication Dose Route Frequency    aspirin chewable tablet 81 mg  81 mg Oral DAILY    metoprolol tartrate (LOPRESSOR) tablet 25 mg  25 mg Oral BID    valsartan (DIOVAN) tablet 160 mg  160 mg Oral DAILY    sodium chloride (NS) flush 5-40 mL  5-40 mL IntraVENous Q8H    sodium chloride (NS) flush 5-40 mL  5-40 mL IntraVENous PRN    polyethylene glycol (MIRALAX) packet 17 g  17 g Oral DAILY PRN    ondansetron (ZOFRAN ODT) tablet 4 mg  4 mg Oral Q8H PRN    Or    ondansetron (ZOFRAN) injection 4 mg  4 mg IntraVENous Q6H PRN    heparin (porcine) injection 5,000 Units  5,000 Units SubCUTAneous Q8H     Current Outpatient Medications   Medication Sig    valsartan (DIOVAN) 160 mg tablet Take 160 mg by mouth daily.     metoprolol tartrate (LOPRESSOR) 25 mg tablet Take 25 mg by mouth two (2) times a day.  rosuvastatin (CRESTOR) 5 mg tablet Take 5 mg by mouth nightly.  aspirin 81 mg chewable tablet Take 81 mg by mouth daily. Review of Systems:  A detailed 10 organ review of systems is obtained with pertinent positives as listed in the History of Present Illness and Past Medical History. All others are negative. Objective:     Physical Exam:  Visit Vitals  BP (!) 142/86   Pulse 73   Temp 99.1 °F (37.3 °C)   Resp (!) 32   Wt 99.8 kg (220 lb)   SpO2 92%   BMI 34.46 kg/m²        Skin:  Extremities and face reveal no rashes. No cruz erythema. No telangiectasias on the chest wall. HEENT: Sclerae anicteric. Extra-occular muscles are intact. No oral ulcers. No abnormal pigmentation of the lips. The neck is supple. Cardiovascular: Regular rate and rhythm. No murmurs, gallops, or rubs. PMI nondisplaced. Carotids without bruits. Respiratory:  Comfortable breathing with no accessory muscle use. Clear breath sounds with no wheezes, rales, or rhonchi. GI:  Abdomen nondistended, soft, and nontender. Normal active bowel sounds. No enlargement of the liver or spleen. No masses palpable. Rectal:  Deferred  Musculoskeletal:  No pitting edema of the lower legs. Extremities have good range of motion. No costovertebral tenderness. Neurological:  Gross memory appears intact. Patient is alert and oriented. Psychiatric:  Mood appears appropriate with judgement intact. Lymphatic:  No cervical or supraclavicular adenopathy.     Lab/Data Review:  CMP:   Lab Results   Component Value Date/Time     (L) 12/29/2021 09:21 AM    K 4.3 12/29/2021 11:45 AM     12/29/2021 09:21 AM    CO2 23 12/29/2021 09:21 AM    AGAP 9 12/29/2021 09:21 AM    GLU 93 12/29/2021 09:21 AM    BUN 18 12/29/2021 09:21 AM    CREA 1.37 (H) 12/29/2021 09:21 AM    GFRAA 59 (L) 12/29/2021 09:21 AM    GFRNA 49 (L) 12/29/2021 09:21 AM    CA 8.8 12/29/2021 09:21 AM    MG 2.2 12/29/2021 11:45 AM    PHOS 2.2 (L) 12/29/2021 09:21 AM ALB 3.1 (L) 12/29/2021 09:21 AM    TP 6.8 12/29/2021 09:21 AM    GLOB 3.7 12/29/2021 09:21 AM    AGRAT 0.8 (L) 12/29/2021 09:21 AM     (H) 12/29/2021 09:21 AM     CBC:   Lab Results   Component Value Date/Time    WBC 9.5 12/29/2021 11:45 AM    HGB 14.3 12/29/2021 11:45 AM    HCT 41.3 12/29/2021 11:45 AM     (L) 12/29/2021 11:45 AM     Pancreatic Markers:   Lab Results   Component Value Date/Time    LPSE 65 (L) 12/29/2021 09:21 AM     CT scan: LUNG BASES:Coronary vascular calcifications. Cardiomegaly. 3 mm nodules right  lung base. LIVER: Scattered hypodensities are too small to characterize. There is no  intrahepatic duct dilatation. There is no hepatic parenchymal mass. Hepatic  enhancement pattern is within normal limits. Portal vein is patent. GALLBLADDER:  No dilatation or wall thickening. SPLEEN/PANCREAS: No mass. There is no pancreatic duct dilatation. There is no  evidence of splenomegaly. ADRENALS/KIDNEYS: No mass. There is no hydronephrosis. There is no renal mass. There is no perinephric mass. STOMACH: Small hiatal hernia. COLON AND SMALL BOWEL: Fecal stasis is moderate. Colonic diverticulosis. There  is no free intraperitoneal air. There is no evidence of incarceration or  obstruction. No mesenteric adenopathy. Assessment/Plan:     Principal Problem:    Acute hepatitis (12/29/2021)         This is a case of new liver enzyme abnormalities associated with new cardiac event. Other findings include that of biliary sludge without any evidence for inflammation. No obvious mass lesions are seen, evidence for malignancy. It is possible that his recent pain complaints are secondary to his moderate stool burden; will give laxatives. It is also possible that his new liver enzyme abnormalities are cardiac event in nature; which simply tracked these with time and if improving in parallel with cardiac situation with with all other evaluation.   Additional imaging or blood testing to be recommended if progressive liver enzyme abnormality identified.     We will check again next week

## 2021-12-30 LAB
ALBUMIN SERPL-MCNC: 2.9 G/DL (ref 3.5–5)
ALBUMIN/GLOB SERPL: 0.9 {RATIO} (ref 1.1–2.2)
ALP SERPL-CCNC: 93 U/L (ref 45–117)
ALT SERPL-CCNC: 324 U/L (ref 12–78)
ANION GAP SERPL CALC-SCNC: 8 MMOL/L (ref 5–15)
AST SERPL-CCNC: 179 U/L (ref 15–37)
BASOPHILS # BLD: 0 K/UL (ref 0–0.1)
BASOPHILS NFR BLD: 0 % (ref 0–1)
BILIRUB DIRECT SERPL-MCNC: 2 MG/DL (ref 0–0.2)
BILIRUB SERPL-MCNC: 3.6 MG/DL (ref 0.2–1)
BUN SERPL-MCNC: 26 MG/DL (ref 6–20)
BUN/CREAT SERPL: 19 (ref 12–20)
CALCIUM SERPL-MCNC: 8.7 MG/DL (ref 8.5–10.1)
CHLORIDE SERPL-SCNC: 106 MMOL/L (ref 97–108)
CO2 SERPL-SCNC: 22 MMOL/L (ref 21–32)
COMMENT, HOLDF: NORMAL
CREAT SERPL-MCNC: 1.35 MG/DL (ref 0.7–1.3)
CRP SERPL-MCNC: 18.3 MG/DL (ref 0–0.6)
DIFFERENTIAL METHOD BLD: ABNORMAL
EOSINOPHIL # BLD: 0 K/UL (ref 0–0.4)
EOSINOPHIL NFR BLD: 0 % (ref 0–7)
ERYTHROCYTE [DISTWIDTH] IN BLOOD BY AUTOMATED COUNT: 13.6 % (ref 11.5–14.5)
GLOBULIN SER CALC-MCNC: 3.4 G/DL (ref 2–4)
GLUCOSE SERPL-MCNC: 105 MG/DL (ref 65–100)
HCT VFR BLD AUTO: 41 % (ref 36.6–50.3)
HGB BLD-MCNC: 14.1 G/DL (ref 12.1–17)
IMM GRANULOCYTES # BLD AUTO: 0.1 K/UL (ref 0–0.04)
IMM GRANULOCYTES NFR BLD AUTO: 1 % (ref 0–0.5)
LACTATE SERPL-SCNC: 1 MMOL/L (ref 0.4–2)
LYMPHOCYTES # BLD: 0.4 K/UL (ref 0.8–3.5)
LYMPHOCYTES NFR BLD: 5 % (ref 12–49)
MCH RBC QN AUTO: 31 PG (ref 26–34)
MCHC RBC AUTO-ENTMCNC: 34.4 G/DL (ref 30–36.5)
MCV RBC AUTO: 90.1 FL (ref 80–99)
MONOCYTES # BLD: 0.9 K/UL (ref 0–1)
MONOCYTES NFR BLD: 12 % (ref 5–13)
NEUTS SEG # BLD: 5.7 K/UL (ref 1.8–8)
NEUTS SEG NFR BLD: 82 % (ref 32–75)
NRBC # BLD: 0 K/UL (ref 0–0.01)
NRBC BLD-RTO: 0 PER 100 WBC
PLATELET # BLD AUTO: 82 K/UL (ref 150–400)
PMV BLD AUTO: 12.1 FL (ref 8.9–12.9)
POTASSIUM SERPL-SCNC: 3.8 MMOL/L (ref 3.5–5.1)
PROCALCITONIN SERPL-MCNC: 6.14 NG/ML
PROT SERPL-MCNC: 6.3 G/DL (ref 6.4–8.2)
RBC # BLD AUTO: 4.55 M/UL (ref 4.1–5.7)
RBC MORPH BLD: ABNORMAL
SAMPLES BEING HELD,HOLD: NORMAL
SARS-COV-2, XPLCVT: NOT DETECTED
SODIUM SERPL-SCNC: 136 MMOL/L (ref 136–145)
SOURCE, COVRS: NORMAL
WBC # BLD AUTO: 7.1 K/UL (ref 4.1–11.1)

## 2021-12-30 PROCEDURE — 74011250636 HC RX REV CODE- 250/636: Performed by: INTERNAL MEDICINE

## 2021-12-30 PROCEDURE — 84145 PROCALCITONIN (PCT): CPT

## 2021-12-30 PROCEDURE — 74011000250 HC RX REV CODE- 250: Performed by: INTERNAL MEDICINE

## 2021-12-30 PROCEDURE — 87086 URINE CULTURE/COLONY COUNT: CPT

## 2021-12-30 PROCEDURE — 36415 COLL VENOUS BLD VENIPUNCTURE: CPT

## 2021-12-30 PROCEDURE — 80076 HEPATIC FUNCTION PANEL: CPT

## 2021-12-30 PROCEDURE — 83605 ASSAY OF LACTIC ACID: CPT

## 2021-12-30 PROCEDURE — 74011250637 HC RX REV CODE- 250/637: Performed by: INTERNAL MEDICINE

## 2021-12-30 PROCEDURE — 85025 COMPLETE CBC W/AUTO DIFF WBC: CPT

## 2021-12-30 PROCEDURE — 99223 1ST HOSP IP/OBS HIGH 75: CPT | Performed by: INTERNAL MEDICINE

## 2021-12-30 PROCEDURE — 80048 BASIC METABOLIC PNL TOTAL CA: CPT

## 2021-12-30 PROCEDURE — 86140 C-REACTIVE PROTEIN: CPT

## 2021-12-30 PROCEDURE — 74011250637 HC RX REV CODE- 250/637: Performed by: FAMILY MEDICINE

## 2021-12-30 PROCEDURE — 65270000029 HC RM PRIVATE

## 2021-12-30 PROCEDURE — 77030019905 HC CATH URETH INTMIT MDII -A

## 2021-12-30 RX ORDER — IBUPROFEN 200 MG
400 TABLET ORAL ONCE
Status: COMPLETED | OUTPATIENT
Start: 2021-12-30 | End: 2021-12-30

## 2021-12-30 RX ADMIN — HEPARIN SODIUM 5000 UNITS: 5000 INJECTION INTRAVENOUS; SUBCUTANEOUS at 06:31

## 2021-12-30 RX ADMIN — IBUPROFEN 400 MG: 200 TABLET, FILM COATED ORAL at 04:11

## 2021-12-30 RX ADMIN — Medication 10 ML: at 17:25

## 2021-12-30 RX ADMIN — WATER 2 G: 1 INJECTION INTRAMUSCULAR; INTRAVENOUS; SUBCUTANEOUS at 10:44

## 2021-12-30 RX ADMIN — VALSARTAN 160 MG: 80 TABLET, FILM COATED ORAL at 10:43

## 2021-12-30 RX ADMIN — METOPROLOL TARTRATE 25 MG: 25 TABLET, FILM COATED ORAL at 10:44

## 2021-12-30 RX ADMIN — HEPARIN SODIUM 5000 UNITS: 5000 INJECTION INTRAVENOUS; SUBCUTANEOUS at 16:11

## 2021-12-30 RX ADMIN — ASPIRIN 81 MG: 81 TABLET, CHEWABLE ORAL at 10:44

## 2021-12-30 RX ADMIN — Medication 10 ML: at 06:31

## 2021-12-30 RX ADMIN — METOPROLOL TARTRATE 25 MG: 25 TABLET, FILM COATED ORAL at 17:25

## 2021-12-30 RX ADMIN — WATER 2 G: 1 INJECTION INTRAMUSCULAR; INTRAVENOUS; SUBCUTANEOUS at 21:01

## 2021-12-30 RX ADMIN — HEPARIN SODIUM 5000 UNITS: 5000 INJECTION INTRAVENOUS; SUBCUTANEOUS at 21:01

## 2021-12-30 RX ADMIN — Medication 10 ML: at 22:00

## 2021-12-30 NOTE — PROGRESS NOTES
12/30/21  2:44 PM    Care Management Transition of Care:    RUR: 12%  Level: Low  LOS: 1D    1). Patient requiring medical management  2). Cardiology consulted  3). GI consulted  4). PT/OT evals  5).  CM following for dispo needs    YUNIER Acevedo  Care Manager

## 2021-12-30 NOTE — CONSULTS
Infectious Disease Consult    Impression/Plan   · GNR bacteremia. With the urinary incontinence I suspect this may be due to a urinary tract infection although the UA was bland. Biliary tract infection is also in differential with elevated LFTs although CT scanning was unrevealing. Agree with cefepime. We will add metronidazole for anaerobic coverage pending further work-up. Repeat blood cultures in a.m.  A urine culture was ordered for today. Further recommendations will be made once the organism has been identified. This was discussed at length with patient's daughter at bedside today  · Elevated liver enzymes. No obvious etiology seen on CT scan of the abdomen and pelvis. May be due to gram-negative meme sepsis. GI is following. LFTs trending down. Acute hepatitis panel negative  · Elevated troponin. Cardiology following  · History of prostate cancer. ID service will follow peripherally over the weekend. Please contact provider on call with any questions     Anti-infectives:   1. Cefepime  2. Metronidazole    Subjective:   Date of Consultation:  December 30, 2021  Date of Admission: 12/28/2021   Referring Physician: This is an 80-year-old man with a past medical history significant for dyslipidemia, hypertension, dementia, who was admitted for evaluation of fever and abdominal pain. The patient was in his usual state of health up until approximately 2 weeks ago when he developed incontinence. Patient sees urology and has had issues with urinary retention. He has been on an unknown medication for urinary retention which the patient stopped on his own several weeks ago due to side effects. The family initially thought the urinary incontinence may have been due to stopping this medication. The patient subsequently developed sharp lower abdominal pains and fevers. He was instructed by his physician to report to the emergency department for further evaluation and treatment.   Work-up has revealed elevated transaminases and a gram-negative meme bacteremia. He is currently on cefepime. The infectious diseases service has been asked to assist with antibiotic management      Patient Active Problem List   Diagnosis Code    Acute hepatitis B17.9     No past medical history on file. No family history on file. Social History     Tobacco Use    Smoking status: Not on file    Smokeless tobacco: Not on file   Substance Use Topics    Alcohol use: Not on file     No past surgical history on file. Prior to Admission medications    Medication Sig Start Date End Date Taking? Authorizing Provider   valsartan (DIOVAN) 160 mg tablet Take 160 mg by mouth daily. Yes Other, MD Irwin   metoprolol tartrate (LOPRESSOR) 25 mg tablet Take 25 mg by mouth two (2) times a day. Yes Elissa, MD Irwin   rosuvastatin (CRESTOR) 5 mg tablet Take 5 mg by mouth nightly. Yes Other, MD Irwin   aspirin 81 mg chewable tablet Take 81 mg by mouth daily. Yes Other, MD Irwin     No Known Allergies     Review of Systems:  A comprehensive review of systems was negative except for that written in the History of Present Illness. Objective:   Blood pressure 122/75, pulse 76, temperature 97.6 °F (36.4 °C), resp. rate 24, weight 220 lb (99.8 kg), SpO2 100 %. Temp (24hrs), Av.6 °F (38.1 °C), Min:97.6 °F (36.4 °C), Max:103 °F (39.4 °C)       Exam:    General:  Alert, cooperative, well noursished, well developed, appears stated age   Eyes:  Sclera anicteric.     Mouth/Throat: Mucous membranes normal   Neck: Supple   Lungs:   Clear to auscultation anteriorly   CV:  Regular rate and rhythm,no murmur   Abdomen:   Soft, non-tender, nondistender   Extremities:  No edema   Skin:  No rash   Lymph nodes:    Musculoskeletal:  Moves all   Lines/Devices:  Intact, no erythema, drainage or tenderness   Psych: Alert and oriented, normal mood affect given the setting       Data Review:   Recent Results (from the past 24 hour(s))   LACTIC ACID Collection Time: 12/30/21  1:56 AM   Result Value Ref Range    Lactic acid 1.0 0.4 - 2.0 MMOL/L   CBC WITH AUTOMATED DIFF    Collection Time: 12/30/21  2:00 AM   Result Value Ref Range    WBC 7.1 4.1 - 11.1 K/uL    RBC 4.55 4.10 - 5.70 M/uL    HGB 14.1 12.1 - 17.0 g/dL    HCT 41.0 36.6 - 50.3 %    MCV 90.1 80.0 - 99.0 FL    MCH 31.0 26.0 - 34.0 PG    MCHC 34.4 30.0 - 36.5 g/dL    RDW 13.6 11.5 - 14.5 %    PLATELET 82 (L) 883 - 400 K/uL    MPV 12.1 8.9 - 12.9 FL    NRBC 0.0 0  WBC    ABSOLUTE NRBC 0.00 0.00 - 0.01 K/uL    NEUTROPHILS 82 (H) 32 - 75 %    LYMPHOCYTES 5 (L) 12 - 49 %    MONOCYTES 12 5 - 13 %    EOSINOPHILS 0 0 - 7 %    BASOPHILS 0 0 - 1 %    IMMATURE GRANULOCYTES 1 (H) 0.0 - 0.5 %    ABS. NEUTROPHILS 5.7 1.8 - 8.0 K/UL    ABS. LYMPHOCYTES 0.4 (L) 0.8 - 3.5 K/UL    ABS. MONOCYTES 0.9 0.0 - 1.0 K/UL    ABS. EOSINOPHILS 0.0 0.0 - 0.4 K/UL    ABS. BASOPHILS 0.0 0.0 - 0.1 K/UL    ABS. IMM.  GRANS. 0.1 (H) 0.00 - 0.04 K/UL    DF SMEAR SCANNED      RBC COMMENTS NORMOCYTIC, NORMOCHROMIC     METABOLIC PANEL, BASIC    Collection Time: 12/30/21  2:00 AM   Result Value Ref Range    Sodium 136 136 - 145 mmol/L    Potassium 3.8 3.5 - 5.1 mmol/L    Chloride 106 97 - 108 mmol/L    CO2 22 21 - 32 mmol/L    Anion gap 8 5 - 15 mmol/L    Glucose 105 (H) 65 - 100 mg/dL    BUN 26 (H) 6 - 20 MG/DL    Creatinine 1.35 (H) 0.70 - 1.30 MG/DL    BUN/Creatinine ratio 19 12 - 20      GFR est AA >60 >60 ml/min/1.73m2    GFR est non-AA 50 (L) >60 ml/min/1.73m2    Calcium 8.7 8.5 - 10.1 MG/DL   PROCALCITONIN    Collection Time: 12/30/21  2:00 AM   Result Value Ref Range    Procalcitonin 6.14 ng/mL   C REACTIVE PROTEIN, QT    Collection Time: 12/30/21  2:00 AM   Result Value Ref Range    C-Reactive protein 18.30 (H) 0.00 - 0.60 mg/dL   SAMPLES BEING HELD    Collection Time: 12/30/21  2:00 AM   Result Value Ref Range    SAMPLES BEING HELD [id number]     COMMENT        Add-on orders for these samples will be processed based on acceptable specimen integrity and analyte stability, which may vary by analyte. HEPATIC FUNCTION PANEL    Collection Time: 12/30/21  2:00 AM   Result Value Ref Range    Protein, total 6.3 (L) 6.4 - 8.2 g/dL    Albumin 2.9 (L) 3.5 - 5.0 g/dL    Globulin 3.4 2.0 - 4.0 g/dL    A-G Ratio 0.9 (L) 1.1 - 2.2      Bilirubin, total 3.6 (H) 0.2 - 1.0 MG/DL    Bilirubin, direct 2.0 (H) 0.0 - 0.2 MG/DL    Alk.  phosphatase 93 45 - 117 U/L    AST (SGOT) 179 (H) 15 - 37 U/L    ALT (SGPT) 324 (H) 12 - 78 U/L        Microbiology:      Studies:      Signed By: Michelle Park DO     December 30, 2021

## 2021-12-30 NOTE — PROGRESS NOTES
8694 MEWS score 4. spoke to nurse director. Temperature has been elevated throughout the day, previous platelets 680. Respirations high 20s, low 30s, BP above & below WNL throughout day. Called code SEPSIS. RRT nurse & nurse sup @ bedside. Labs drawn. Notified on call, Dr. Dorothea Holt. Ibuprofen has been ordered as antipyretic med.

## 2021-12-30 NOTE — PROGRESS NOTES
Problem: Pressure Injury - Risk of  Goal: *Prevention of pressure injury  Description: Document Adarsh Scale and appropriate interventions in the flowsheet. Outcome: Progressing Towards Goal  Note: Pressure Injury Interventions:  Sensory Interventions: Assess changes in LOC,Assess need for specialty bed,Discuss PT/OT consult with provider,Check visual cues for pain,Keep linens dry and wrinkle-free,Turn and reposition approx. every two hours (pillows and wedges if needed)    Moisture Interventions: Absorbent underpads,Check for incontinence Q2 hours and as needed    Activity Interventions: Increase time out of bed    Mobility Interventions: PT/OT evaluation    Nutrition Interventions: Document food/fluid/supplement intake    Friction and Shear Interventions: HOB 30 degrees or less,Minimize layers,Transferring/repositioning devices                Problem: Patient Education: Go to Patient Education Activity  Goal: Patient/Family Education  Outcome: Progressing Towards Goal     Problem: Falls - Risk of  Goal: *Absence of Falls  Description: Document Kayley Fall Risk and appropriate interventions in the flowsheet.   Outcome: Progressing Towards Goal  Note: Fall Risk Interventions:  Mobility Interventions: Bed/chair exit alarm,Patient to call before getting OOB    Mentation Interventions: Adequate sleep, hydration, pain control,Bed/chair exit alarm,Family/sitter at bedside,Door open when patient unattended    Medication Interventions: Bed/chair exit alarm,Patient to call before getting OOB,Teach patient to arise slowly    Elimination Interventions: Bed/chair exit alarm,Call light in reach,Stay With Me (per policy),Patient to call for help with toileting needs              Problem: Patient Education: Go to Patient Education Activity  Goal: Patient/Family Education  Outcome: Progressing Towards Goal

## 2021-12-30 NOTE — PROGRESS NOTES
[facility] [address]  [address] [facility], [place], [id number] [place] [address]  [phone]      Medical Progress Note      NAME: Tg Sams   :  1933  MRM:  [id number]    Date/Time of service: 2021  8:22 AM       Subjective:     Chief Complaint:  \"nothing\"    Patient sitting in chair by window, son present also. 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 Per son, patient has poor hygiene and does not wash hands well or wear gloves, protective gear during jobs. Case d/w daughter, Yaritza Began, via phone during encounter. Discussed lab and imaging results as well as plan for infection specialist consult, antibiotics. All questions addressed to her satisfaction. Objective:       Vitals:       Last 24hrs VS reviewed since prior progress note.  Most recent are:    Visit Vitals  /63 (BP 1 Location: Right upper arm, BP Patient Position: Lying)   Pulse 73   Temp (!) 102.1 °F (38.9 °C)   Resp 30   Wt 99.8 kg (220 lb)   SpO2 93%   BMI 34.46 kg/m²     SpO2 Readings from Last 6 Encounters:   21 93%   21 96%            Intake/Output Summary (Last 24 hours) at 2021 7572  Last data filed at 2021 2211  Gross per 24 hour   Intake 240 ml   Output 100 ml   Net 140 ml        Exam:     Physical Exam:    Gen:  Well-developed, well-nourished, in no acute distress  HEENT:  Pink conjunctivae, PERRL, hearing intact to voice, moist mucous membranes  Neck:  Supple, without masses, thyroid non-tender  Resp:  No accessory muscle use, clear breath sounds without wheezes rales or rhonchi  Card:  No murmurs, normal S1, S2 without thrills, bruits or peripheral edema  Abd:  Soft, non-tender, non-distended, normoactive bowel sounds are present, no palpable organomegaly and no detectable hernias  Musc:  No cyanosis or clubbing  Skin:  No rashes or ulcers, skin turgor is good; +vitiligo changes on arms  Neuro: Nonfocal, moves extremities, follows commands  Psych:  Good insight, oriented to person, place and time, alert    Medications Reviewed: (see below)    Lab Data Reviewed: (see below)    ______________________________________________________________________    Medications:     Current Facility-Administered Medications   Medication Dose Route Frequency    aspirin chewable tablet 81 mg  81 mg Oral DAILY    metoprolol tartrate (LOPRESSOR) tablet 25 mg  25 mg Oral BID    valsartan (DIOVAN) tablet 160 mg  160 mg Oral DAILY    sodium chloride (NS) flush 5-40 mL  5-40 mL IntraVENous Q8H    sodium chloride (NS) flush 5-40 mL  5-40 mL IntraVENous PRN    polyethylene glycol (MIRALAX) packet 17 g  17 g Oral DAILY PRN    ondansetron (ZOFRAN ODT) tablet 4 mg  4 mg Oral Q8H PRN    Or    ondansetron (ZOFRAN) injection 4 mg  4 mg IntraVENous Q6H PRN    heparin (porcine) injection 5,000 Units  5,000 Units SubCUTAneous Q8H    polyethylene glycol (MIRALAX) packet 17 g  17 g Oral DAILY    bisacodyL (DULCOLAX) tablet 10 mg  10 mg Oral DAILY    cefepime (MAXIPIME) 2 g in sterile water (preservative free) 10 mL IV syringe  2 g IntraVENous Q12H          Lab Review:     Recent Labs     12/30/21  0200 12/29/21  1145 12/28/21  2251   WBC 7.1 9.5 7.2   HGB 14.1 14.3 15.1   HCT 41.0 41.3 43.9   PLT 82* 108* 137*     Recent Labs     12/30/21  0200 12/29/21  1145 12/29/21  0921 12/28/21  2251 12/28/21  2251     --  135*  --  138   K 3.8 4.3 HEMOLYZED,RECOLLECT REQUESTED   < > 4.2     --  103  --  105   CO2 22  --  23  --  27   *  --  93  --  108*   BUN 26*  --  18  --  19   CREA 1.35*  --  1.37*  --  1.30   CA 8.7  --  8.8  --  9.1   MG  --  2.2 HEMOLYZED,RECOLLECT REQUESTED  --   --    PHOS  --   --  2.2*  --   --    ALB  --   --  3.1*  --  3.5   TBILI  --   --  5.2*  --  4.1*   ALT  --   --  486*  --  734*   INR  --  1.5*  --   --   --     < > = values in this interval not displayed.           Assessment / Plan: 1. Sepsis secondary to bacteremia - bcx +GNR, cefepime started 12/29; covid neg 12/29, c/s ID  2. Elevated LFTs - trend, appreciate GI recs; US abd, CT without evidence of hepatic, biliary issue; small sludge in GB  3. Elevated troponin - appreciate cardiology input, not ACS  4. Dementia  5. HTN  6. History of prostate cancer - s/p radiation  7. Obesity - BMI 34.46  8. VTE prophylaxis - heparin  9. Dispo - plan to dc to home once stable    Time spent 35 minutes, greater than 50% in counseling and coordination of care.     Attending Physician: Alfredo Gamble DO

## 2021-12-31 LAB
BACTERIA SPEC CULT: ABNORMAL
BACTERIA SPEC CULT: NORMAL
SERVICE CMNT-IMP: ABNORMAL
SERVICE CMNT-IMP: ABNORMAL
SERVICE CMNT-IMP: NORMAL

## 2021-12-31 PROCEDURE — 65270000029 HC RM PRIVATE

## 2021-12-31 PROCEDURE — 74011250637 HC RX REV CODE- 250/637: Performed by: INTERNAL MEDICINE

## 2021-12-31 PROCEDURE — 74011250636 HC RX REV CODE- 250/636: Performed by: INTERNAL MEDICINE

## 2021-12-31 PROCEDURE — 74011000250 HC RX REV CODE- 250: Performed by: INTERNAL MEDICINE

## 2021-12-31 RX ADMIN — Medication 10 ML: at 05:20

## 2021-12-31 RX ADMIN — METOPROLOL TARTRATE 25 MG: 25 TABLET, FILM COATED ORAL at 08:14

## 2021-12-31 RX ADMIN — HEPARIN SODIUM 5000 UNITS: 5000 INJECTION INTRAVENOUS; SUBCUTANEOUS at 13:47

## 2021-12-31 RX ADMIN — Medication 10 ML: at 13:47

## 2021-12-31 RX ADMIN — VALSARTAN 160 MG: 80 TABLET, FILM COATED ORAL at 08:15

## 2021-12-31 RX ADMIN — HEPARIN SODIUM 5000 UNITS: 5000 INJECTION INTRAVENOUS; SUBCUTANEOUS at 05:20

## 2021-12-31 RX ADMIN — WATER 2 G: 1 INJECTION INTRAMUSCULAR; INTRAVENOUS; SUBCUTANEOUS at 20:29

## 2021-12-31 RX ADMIN — WATER 2 G: 1 INJECTION INTRAMUSCULAR; INTRAVENOUS; SUBCUTANEOUS at 08:15

## 2021-12-31 RX ADMIN — POLYETHYLENE GLYCOL 3350 17 G: 17 POWDER, FOR SOLUTION ORAL at 08:15

## 2021-12-31 RX ADMIN — HEPARIN SODIUM 5000 UNITS: 5000 INJECTION INTRAVENOUS; SUBCUTANEOUS at 20:29

## 2021-12-31 RX ADMIN — ASPIRIN 81 MG: 81 TABLET, CHEWABLE ORAL at 08:14

## 2021-12-31 RX ADMIN — BISACODYL 10 MG: 5 TABLET, COATED ORAL at 08:15

## 2021-12-31 RX ADMIN — METOPROLOL TARTRATE 25 MG: 25 TABLET, FILM COATED ORAL at 18:01

## 2021-12-31 NOTE — PROGRESS NOTES
[facility]  [address] [facility], [address] [place] [address]  [phone]      Medical Progress Note      NAME: Teresa Butt   :  1933  MRM:  [id number]    Date/Time of service: 2021  8:22 AM       Subjective:     Chief Complaint: wants to go home    Patient sitting up in bed, sleepy today but arousable, no complaints today. Daughter present during encounter. Discussed awaiting cultures for antibiotic recs. All questions addressed to their satisfaction. Objective:       Vitals:       Last 24hrs VS reviewed since prior progress note.  Most recent are:    Visit Vitals  /79 (BP 1 Location: Right upper arm, BP Patient Position: At rest;Lying)   Pulse 62   Temp 99.2 °F (37.3 °C)   Resp 20   Wt 99.8 kg (220 lb)   SpO2 94%   BMI 34.46 kg/m²     SpO2 Readings from Last 6 Encounters:   21 94%   21 96%            Intake/Output Summary (Last 24 hours) at 2021 0836  Last data filed at 2021 1923  Gross per 24 hour   Intake    Output 200 ml   Net -200 ml        Exam:     Physical Exam:    Gen:  Well-developed, well-nourished, in no acute distress  HEENT:  Pink conjunctivae, PERRL, hearing intact to voice, moist mucous membranes  Neck:  Supple, without masses, thyroid non-tender  Resp:  No accessory muscle use, clear breath sounds without wheezes rales or rhonchi  Card:  No murmurs, normal S1, S2 without thrills, bruits or peripheral edema  Abd:  Soft, non-tender, non-distended, normoactive bowel sounds are present, no palpable organomegaly and no detectable hernias  Musc:  No cyanosis or clubbing  Skin:  No rashes or ulcers, skin turgor is good; +vitiligo changes on arms  Neuro:  Nonfocal, moves extremities, follows commands  Psych:  Good insight, oriented to person, place and time, alert    Medications Reviewed: (see below)    Lab Data Reviewed: (see below)    ______________________________________________________________________    Medications: Current Facility-Administered Medications   Medication Dose Route Frequency    aspirin chewable tablet 81 mg  81 mg Oral DAILY    metoprolol tartrate (LOPRESSOR) tablet 25 mg  25 mg Oral BID    valsartan (DIOVAN) tablet 160 mg  160 mg Oral DAILY    sodium chloride (NS) flush 5-40 mL  5-40 mL IntraVENous Q8H    sodium chloride (NS) flush 5-40 mL  5-40 mL IntraVENous PRN    polyethylene glycol (MIRALAX) packet 17 g  17 g Oral DAILY PRN    ondansetron (ZOFRAN ODT) tablet 4 mg  4 mg Oral Q8H PRN    Or    ondansetron (ZOFRAN) injection 4 mg  4 mg IntraVENous Q6H PRN    heparin (porcine) injection 5,000 Units  5,000 Units SubCUTAneous Q8H    polyethylene glycol (MIRALAX) packet 17 g  17 g Oral DAILY    bisacodyL (DULCOLAX) tablet 10 mg  10 mg Oral DAILY    cefepime (MAXIPIME) 2 g in sterile water (preservative free) 10 mL IV syringe  2 g IntraVENous Q12H          Lab Review:     Recent Labs     12/30/21  0200 12/29/21  1145 12/28/21  2251   WBC 7.1 9.5 7.2   HGB 14.1 14.3 15.1   HCT 41.0 41.3 43.9   PLT 82* 108* 137*     Recent Labs     12/30/21  0200 12/29/21  1145 12/29/21  0921 12/28/21  2251 12/28/21  2251     --  135*  --  138   K 3.8 4.3 HEMOLYZED,RECOLLECT REQUESTED   < > 4.2     --  103  --  105   CO2 22  --  23  --  27   *  --  93  --  108*   BUN 26*  --  18  --  19   CREA 1.35*  --  1.37*  --  1.30   CA 8.7  --  8.8  --  9.1   MG  --  2.2 HEMOLYZED,RECOLLECT REQUESTED  --   --    PHOS  --   --  2.2*  --   --    ALB 2.9*  --  3.1*  --  3.5   TBILI 3.6*  --  5.2*  --  4.1*   *  --  486*  --  734*   INR  --  1.5*  --   --   --     < > = values in this interval not displayed. Assessment / Plan:     1. Sepsis secondary to bacteremia - bcx +GNR, cefepime started 12/29, flagyl 12/30; covid neg 12/29, ID following; ucx pending  2. Elevated LFTs - trending down, appreciate GI recs; US abd, CT without evidence of hepatic, biliary issue; small sludge in GB  3.  Elevated troponin - appreciate cardiology input, not ACS  4. Dementia  5. HTN  6. History of prostate cancer - s/p radiation  7. Obesity - BMI 34.46  8. VTE prophylaxis - heparin  9.  Dispo - plan to dc to home once stable    Attending Physician: Feliciano Apo, DO

## 2022-01-01 VITALS
WEIGHT: 220 LBS | OXYGEN SATURATION: 94 % | DIASTOLIC BLOOD PRESSURE: 72 MMHG | SYSTOLIC BLOOD PRESSURE: 130 MMHG | TEMPERATURE: 98.3 F | HEART RATE: 68 BPM | BODY MASS INDEX: 34.46 KG/M2 | RESPIRATION RATE: 18 BRPM

## 2022-01-01 PROBLEM — F03.90 DEMENTIA (HCC): Status: ACTIVE | Noted: 2022-01-01

## 2022-01-01 PROBLEM — A41.59 ENTEROBACTER SEPSIS (HCC): Status: ACTIVE | Noted: 2022-01-01

## 2022-01-01 LAB
ALBUMIN SERPL-MCNC: 2.4 G/DL (ref 3.5–5)
ALBUMIN SERPL-MCNC: 2.6 G/DL (ref 3.5–5)
ALBUMIN/GLOB SERPL: 0.5 {RATIO} (ref 1.1–2.2)
ALBUMIN/GLOB SERPL: 0.7 {RATIO} (ref 1.1–2.2)
ALP SERPL-CCNC: 77 U/L (ref 45–117)
ALP SERPL-CCNC: 81 U/L (ref 45–117)
ALT SERPL-CCNC: 144 U/L (ref 12–78)
ALT SERPL-CCNC: 146 U/L (ref 12–78)
ANION GAP SERPL CALC-SCNC: 6 MMOL/L (ref 5–15)
AST SERPL-CCNC: 75 U/L (ref 15–37)
AST SERPL-CCNC: 80 U/L (ref 15–37)
BILIRUB DIRECT SERPL-MCNC: 0.7 MG/DL (ref 0–0.2)
BILIRUB SERPL-MCNC: 1.6 MG/DL (ref 0.2–1)
BILIRUB SERPL-MCNC: 1.6 MG/DL (ref 0.2–1)
BUN SERPL-MCNC: 24 MG/DL (ref 6–20)
BUN/CREAT SERPL: 19 (ref 12–20)
CALCIUM SERPL-MCNC: 8.9 MG/DL (ref 8.5–10.1)
CHLORIDE SERPL-SCNC: 107 MMOL/L (ref 97–108)
CO2 SERPL-SCNC: 24 MMOL/L (ref 21–32)
COMMENT, HOLDF: NORMAL
CREAT SERPL-MCNC: 1.24 MG/DL (ref 0.7–1.3)
GLOBULIN SER CALC-MCNC: 3.5 G/DL (ref 2–4)
GLOBULIN SER CALC-MCNC: 4.9 G/DL (ref 2–4)
GLUCOSE SERPL-MCNC: 117 MG/DL (ref 65–100)
MAGNESIUM SERPL-MCNC: 2.8 MG/DL (ref 1.6–2.4)
PHOSPHATE SERPL-MCNC: 1.7 MG/DL (ref 2.6–4.7)
POTASSIUM SERPL-SCNC: 3.6 MMOL/L (ref 3.5–5.1)
PROT SERPL-MCNC: 5.9 G/DL (ref 6.4–8.2)
PROT SERPL-MCNC: 7.5 G/DL (ref 6.4–8.2)
SAMPLES BEING HELD,HOLD: NORMAL
SODIUM SERPL-SCNC: 137 MMOL/L (ref 136–145)

## 2022-01-01 PROCEDURE — 74011250637 HC RX REV CODE- 250/637: Performed by: INTERNAL MEDICINE

## 2022-01-01 PROCEDURE — 36415 COLL VENOUS BLD VENIPUNCTURE: CPT

## 2022-01-01 PROCEDURE — 74011250636 HC RX REV CODE- 250/636: Performed by: INTERNAL MEDICINE

## 2022-01-01 PROCEDURE — 80053 COMPREHEN METABOLIC PANEL: CPT

## 2022-01-01 PROCEDURE — 80076 HEPATIC FUNCTION PANEL: CPT

## 2022-01-01 PROCEDURE — 74011000250 HC RX REV CODE- 250: Performed by: INTERNAL MEDICINE

## 2022-01-01 PROCEDURE — 84100 ASSAY OF PHOSPHORUS: CPT

## 2022-01-01 PROCEDURE — 83735 ASSAY OF MAGNESIUM: CPT

## 2022-01-01 PROCEDURE — 87040 BLOOD CULTURE FOR BACTERIA: CPT

## 2022-01-01 RX ORDER — LEVOFLOXACIN 500 MG/1
500 TABLET, FILM COATED ORAL
Qty: 7 TABLET | Refills: 0 | Status: SHIPPED | OUTPATIENT
Start: 2022-01-02 | End: 2022-01-09

## 2022-01-01 RX ORDER — LEVOFLOXACIN 250 MG/1
500 TABLET ORAL
Status: DISCONTINUED | OUTPATIENT
Start: 2022-01-02 | End: 2022-01-01 | Stop reason: HOSPADM

## 2022-01-01 RX ADMIN — WATER 2 G: 1 INJECTION INTRAMUSCULAR; INTRAVENOUS; SUBCUTANEOUS at 08:22

## 2022-01-01 RX ADMIN — HEPARIN SODIUM 5000 UNITS: 5000 INJECTION INTRAVENOUS; SUBCUTANEOUS at 05:20

## 2022-01-01 RX ADMIN — POLYETHYLENE GLYCOL 3350 17 G: 17 POWDER, FOR SOLUTION ORAL at 08:21

## 2022-01-01 RX ADMIN — METOPROLOL TARTRATE 25 MG: 25 TABLET, FILM COATED ORAL at 16:32

## 2022-01-01 RX ADMIN — Medication 10 ML: at 13:08

## 2022-01-01 RX ADMIN — BISACODYL 10 MG: 5 TABLET, COATED ORAL at 08:21

## 2022-01-01 RX ADMIN — ASPIRIN 81 MG: 81 TABLET, CHEWABLE ORAL at 08:21

## 2022-01-01 RX ADMIN — HEPARIN SODIUM 5000 UNITS: 5000 INJECTION INTRAVENOUS; SUBCUTANEOUS at 13:07

## 2022-01-01 RX ADMIN — VALSARTAN 160 MG: 80 TABLET, FILM COATED ORAL at 08:21

## 2022-01-01 RX ADMIN — METOPROLOL TARTRATE 25 MG: 25 TABLET, FILM COATED ORAL at 08:21

## 2022-01-01 NOTE — DISCHARGE INSTRUCTIONS
Patient Discharge Instructions    Александр Acevedo / [id number] : 1933    Admitted 2021 Discharged: 2022     Primary Diagnoses  Problem List as of 2022 Date Reviewed: 2022           * (Principal) Enterobacter sepsis ([facility] [address]   Dementia ([facility]   Acute hepatitis          Take Home Medications     · It is important that you take the medication exactly as they are prescribed. · Keep your medication in the bottles provided by the pharmacist and keep a list of the medication names, dosages, and times to be taken in your wallet. · Do not take other medications without consulting your doctor. What to do at Home    Recommended diet: Regular Diet and Increase noncaffeinated fluids    Recommended activity: Activity as tolerated    If you experience worse symptoms, please follow up with your PCP or return to ER. Follow-up with your PCP in a few weeks    Follow-up Information     Follow up With Specialties Details Why Contact Info    Marisol Mercado MD Family Medicine   [address] [facility] [phone]             Information obtained by :  I understand that if any problems occur once I am at home I am to contact my physician. I understand and acknowledge receipt of the instructions indicated above.                                                                                                                                            Physician's or R.N.'s Signature                                                                  Date/Time                                                                                                                                              Patient or Representative Signature                                                          Date/Time

## 2022-01-01 NOTE — PROGRESS NOTES
[facility]    Medical Progress Note      NAME: Kane Mckeon   :  1933  MRM:  [id number]    Date/Time of service 2022  2:01 PM          Assessment and Plan:     Enterobacter bacteremia - POA. Unclear source, but I supsect transient bacteremia during passage of gallstone. Enterobactor is mostly pan sensitive. Improved fully on 4 days of IV cefepime. ID consulted. No Sepsis criteria at all. Repeat cx were not drawn on , , , nor . I will order again. After long discussion with family about risk and benefit, then have decided to DC home on PO levaquin. Elevated LFTs - POA and I suspect due to passage of a gallstone. LFTs trending down, appreciate GI consult. Unremarkable US CT. No further workup. We stopped statin    Dementia - Not on meds    CKD stage 3 / Dehydration - POA, hydrated    Thrombocytopenia - POA, dropping since admit. Stop heparin. Hypertension - Continue metoprolol and valsartan    History of prostate cancer - s/p radiation    Obesity - BMI 34, advise weight loss    Elevated troponin - Not elevated in setting of age and CKD. Appreciate cardiology input, not ACS. No workup       Subjective:     Chief Complaint:  Feels fine    ROS:  (bold if positive, if negative)    Tolerating PT  Tolerating Diet        Objective:     Last 24hrs VS reviewed since prior progress note.  Most recent are:    Visit Vitals  /72 (BP 1 Location: Right upper arm, BP Patient Position: At rest)   Pulse 68   Temp 98.3 °F (36.8 °C)   Resp 18   Wt 99.8 kg (220 lb)   SpO2 94%   BMI 34.46 kg/m²     SpO2 Readings from Last 6 Encounters:   22 94%   21 96%            Intake/Output Summary (Last 24 hours) at 2022 1401  Last data filed at 2022 0842  Gross per 24 hour   Intake 80 ml   Output    Net 80 ml        Physical Exam:    Gen:  Obese, in no acute distress  HEENT:  Pink conjunctivae, PERRL, hearing intact to voice, moist mucous membranes  Neck: Supple, without masses, thyroid non-tender  Resp:  No accessory muscle use, clear breath sounds without wheezes rales or rhonchi  Card:  No murmurs, normal S1, S2 without thrills, bruits or peripheral edema  Abd:  Soft, non-tender, non-distended, normoactive bowel sounds are present, no mass  Lymph:  No cervical or inguinal adenopathy  Musc:  No cyanosis or clubbing  Skin:  No rashes or ulcers, skin turgor is good  Neuro:  Cranial nerves are grossly intact, no focal motor weakness, follows commands appropriately  Psych:  Good insight, oriented to person, place and time, alert    Telemetry reviewed:   normal sinus rhythm  __________________________________________________________________  Medications Reviewed: (see below)  Medications:     Current Facility-Administered Medications   Medication Dose Route Frequency    aspirin chewable tablet 81 mg  81 mg Oral DAILY    metoprolol tartrate (LOPRESSOR) tablet 25 mg  25 mg Oral BID    valsartan (DIOVAN) tablet 160 mg  160 mg Oral DAILY    sodium chloride (NS) flush 5-40 mL  5-40 mL IntraVENous Q8H    sodium chloride (NS) flush 5-40 mL  5-40 mL IntraVENous PRN    polyethylene glycol (MIRALAX) packet 17 g  17 g Oral DAILY PRN    ondansetron (ZOFRAN ODT) tablet 4 mg  4 mg Oral Q8H PRN    Or    ondansetron (ZOFRAN) injection 4 mg  4 mg IntraVENous Q6H PRN    heparin (porcine) injection 5,000 Units  5,000 Units SubCUTAneous Q8H    polyethylene glycol (MIRALAX) packet 17 g  17 g Oral DAILY    bisacodyL (DULCOLAX) tablet 10 mg  10 mg Oral DAILY    cefepime (MAXIPIME) 2 g in sterile water (preservative free) 10 mL IV syringe  2 g IntraVENous Q12H        Lab Data Reviewed: (see below)  Lab Review:     Recent Labs     12/30/21  0200   WBC 7.1   HGB 14.1   HCT 41.0   PLT 82*     Recent Labs     01/01/22  0200 12/30/21  0200   NA  --  136   K  --  3.8   CL  --  106   CO2  --  22   GLU  --  105*   BUN  --  26*   CREA  --  1.35*   CA  --  8.7   ALB 2.4* 2.9*   TBILI 1.6* 3.6*   * 324*     No results found for: GLUCPOC  No results for input(s): PH, PCO2, PO2, HCO3, FIO2 in the last 72 hours. No results for input(s): INR, INREXT in the last 72 hours. All Micro Results     Procedure Component Value Units Date/Time    CULTURE, BLOOD, PAIRED [[id number]]     Order Status: Sent Specimen: Blood     CULTURE, BLOOD [[id number]]     Order Status: Sent Specimen: Blood     CULTURE, URINE [[id number]] Collected: 12/30/21 1933    Order Status: Completed Specimen: Urine from Clean catch Updated: 12/31/21 1940     Special Requests: NO SPECIAL REQUESTS        Culture result: No growth (<1,000 CFU/ML)       CULTURE, BLOOD [[id number]]     Order Status: Sent Specimen: Blood     CULTURE, BLOOD [[id number]]  (Abnormal) Collected: 12/28/21 2251    Order Status: Completed Specimen: Blood Updated: 12/31/21 0950     Special Requests: NO SPECIAL REQUESTS        Culture result:       GRAM NEGATIVE RODS GROWING IN BOTH BOTTLES DRAWN (SITE = RAC)            REFER TO [id number] FOR ID AND SENSITIVITIES      (NOTE) PRELIMINARY REORT OF GRAM NEGATIVE RODS GROWING IN 1 OF 2 BOTTLES, AND A 2ND BOTTLE IS IN TRANSIT TO [facility] FOR TESTING, CALLED TO AND READ BACK BY SEGUNDO BRYANT 12/29/21 1435 [facility]. CULTURE, BLOOD [[id number]]  (Abnormal)  (Susceptibility) Collected: 12/28/21 2251    Order Status: Completed Specimen: Blood Updated: 12/31/21 0949     Special Requests: NO SPECIAL REQUESTS        Culture result:       ENTEROBACTER CLOACAE COMPLEX GROWING IN BOTH BOTTLES DRAWN (SITE = LAC)            (NOTE) PRELIMINARY REPORT OF GRAM NEGATIVE RODS GROWING IN 1 OF 2 BOTTLES, CALLED TO AND READ BACK BY SEGUNDO BRYANT 12/29/21 1440 [facility].     Rogersita Geigersanjiv [[id number]] Collected: 12/28/21 2251    Order Status: Completed Updated: 12/29/21 Janeth 107 [[id number]] Collected: 12/28/21 2251    Order Status: Completed Updated: 12/29/21 Janeth Mccall [[id number]] Collected: 12/28/21 2251    Order Status: Completed Updated: 12/29/21 [address] [[id number]] Collected: 12/28/21 2251    Order Status: Completed Updated: 12/29/21 1142    COVID-19 RAPID TEST [[id number]] Collected: 12/29/21 0334    Order Status: Completed Specimen: Nasopharyngeal Updated: 12/29/21 0409     Specimen source Nasopharyngeal        COVID-19 rapid test Not detected        Comment: Rapid Abbott ID Now       Rapid NAAT:  The specimen is NEGATIVE for SARS-CoV-2, the novel coronavirus associated with COVID-19. Negative results should be treated as presumptive and, if inconsistent with clinical signs and symptoms or necessary for patient management, should be tested with an alternative molecular assay. Negative results do not preclude SARS-CoV-2 infection and should not be used as the sole basis for patient management decisions. This test has been authorized by the FDA under an Emergency Use Authorization (EUA) for use by authorized laboratories. Fact sheet for Healthcare Providers: ConventionUpdate.co.nz  Fact sheet for Patients: ConventionUpdate.co.nz       Methodology: Isothermal Nucleic Acid Amplification               Other pertinent lab: none    Total time spent with patient: 30 Minutes I personally reviewed chart, notes, data and current medications in the medical record. I have personally examined and treated the patient at bedside during this period.                  Care Plan discussed with: Patient, Family, Care Manager, Nursing Staff and >50% of time spent in counseling and coordination of care    Discussed:  Care Plan and D/C Planning    Prophylaxis:  H2B/PPI    Disposition:  Home w/Family           ___________________________________________________    Attending Physician: Jaqueline Flores MD

## 2022-01-01 NOTE — DISCHARGE SUMMARY
Physician Discharge Summary     Patient ID:  Yousuf Mendoza  [id number]  35 y.o.  7/24/1933    Admit date: 12/28/2021    Discharge date of service and time: 1/1/2022    Admission Diagnoses: Acute hepatitis [B17.9]    Discharge Diagnoses:    Principal Diagnosis   Enterobacter sepsis [facility])                                             Hospital Course and other diagnoses  Enterobacter bacteremia - POA. Unclear source, but I supsect transient bacteremia during passage of gallstone. Enterobactor is mostly pan sensitive. Improved fully on 4 days of IV cefepime. ID consulted. No Sepsis criteria at all. Repeat cx were not drawn on 12/29, 12/30, 12/31, nor 1/1. I will order again. After long discussion with family about risk and benefit, then have decided to DC home on PO levaquin.     Elevated LFTs - POA and I suspect due to passage of a gallstone. LFTs trending down, appreciate GI consult. Unremarkable US and CT. No further workup. We stopped statin     Dementia - Not on meds     CKD stage 3 / Dehydration - POA, hydrated     Thrombocytopenia - POA, dropping since admit. Stopped heparin.     Hypertension - Continue metoprolol and valsartan     History of prostate cancer - s/p radiation     Obesity - BMI 34, advise weight loss    Elevated troponin - Not elevated when considered in setting of age and CKD. Appreciate cardiology input, not ACS. No workup    PCP: Raynard Goltz, MD    Consults: ID and GI    Significant Diagnostic Studies: See Hospital Course    Discharged home in improved condition.     Discharge Exam:  /72 (BP 1 Location: Right upper arm, BP Patient Position: At rest)   Pulse 68   Temp 98.3 °F (36.8 °C)   Resp 18   Wt 99.8 kg (220 lb)   SpO2 94%   BMI 34.46 kg/m²      Gen:  Obese, in no acute distress  HEENT:  Pink conjunctivae, PERRL, hearing intact to voice, moist mucous membranes  Neck:  Supple, without masses, thyroid non-tender  Resp:  No accessory muscle use, clear breath sounds without wheezes rales or rhonchi  Card:  No murmurs, normal S1, S2 without thrills, bruits or peripheral edema  Abd:  Soft, non-tender, non-distended, normoactive bowel sounds are present, no mass  Lymph:  No cervical or inguinal adenopathy  Musc:  No cyanosis or clubbing  Skin:  No rashes or ulcers, skin turgor is good  Neuro:  Cranial nerves are grossly intact, no focal motor weakness, follows commands appropriately  Psych:  Good insight, oriented to person, place and time, alert    Patient Instructions:   Current Discharge Medication List      START taking these medications    Details   levoFLOXacin (LEVAQUIN) 500 mg tablet Take 1 Tablet by mouth Daily (before breakfast) for 7 days. Qty: 7 Tablet, Refills: 0         CONTINUE these medications which have NOT CHANGED    Details   valsartan (DIOVAN) 160 mg tablet Take 160 mg by mouth daily. metoprolol tartrate (LOPRESSOR) 25 mg tablet Take 25 mg by mouth two (2) times a day. aspirin 81 mg chewable tablet Take 81 mg by mouth daily. STOP taking these medications       rosuvastatin (CRESTOR) 5 mg tablet Comments:   Reason for Stopping:             Activity: Activity as tolerated  Diet: Regular Diet and Increase noncaffeinated fluids  Wound Care: None needed    Follow-up with your PCP in a few weeks.   Follow-up tests/labs - none    Signed:  Adamaris Falcon MD  1/1/2022  3:49 PM

## 2022-01-06 LAB
BACTERIA SPEC CULT: NORMAL
SERVICE CMNT-IMP: NORMAL

## 2022-03-18 PROBLEM — F03.90 DEMENTIA (HCC): Status: ACTIVE | Noted: 2022-01-01

## 2022-03-19 PROBLEM — A41.59 ENTEROBACTER SEPSIS (HCC): Status: ACTIVE | Noted: 2022-01-01

## 2022-03-19 PROBLEM — B17.9 ACUTE HEPATITIS: Status: ACTIVE | Noted: 2021-12-29

## 2022-05-03 ENCOUNTER — OFFICE VISIT (OUTPATIENT)
Dept: NEUROLOGY | Age: 87
End: 2022-05-03
Payer: MEDICARE

## 2022-05-03 VITALS
OXYGEN SATURATION: 98 % | BODY MASS INDEX: 28.56 KG/M2 | SYSTOLIC BLOOD PRESSURE: 140 MMHG | HEART RATE: 58 BPM | DIASTOLIC BLOOD PRESSURE: 80 MMHG | HEIGHT: 69 IN | WEIGHT: 192.8 LBS

## 2022-05-03 DIAGNOSIS — F01.50 VASCULAR DEMENTIA WITHOUT BEHAVIORAL DISTURBANCE (HCC): Primary | ICD-10-CM

## 2022-05-03 DIAGNOSIS — G21.4 VASCULAR PARKINSONISM (HCC): ICD-10-CM

## 2022-05-03 PROCEDURE — 99205 OFFICE O/P NEW HI 60 MIN: CPT | Performed by: PSYCHIATRY & NEUROLOGY

## 2022-05-03 PROCEDURE — G8510 SCR DEP NEG, NO PLAN REQD: HCPCS | Performed by: PSYCHIATRY & NEUROLOGY

## 2022-05-03 PROCEDURE — 1101F PT FALLS ASSESS-DOCD LE1/YR: CPT | Performed by: PSYCHIATRY & NEUROLOGY

## 2022-05-03 PROCEDURE — G8536 NO DOC ELDER MAL SCRN: HCPCS | Performed by: PSYCHIATRY & NEUROLOGY

## 2022-05-03 PROCEDURE — G8419 CALC BMI OUT NRM PARAM NOF/U: HCPCS | Performed by: PSYCHIATRY & NEUROLOGY

## 2022-05-03 PROCEDURE — G8427 DOCREV CUR MEDS BY ELIG CLIN: HCPCS | Performed by: PSYCHIATRY & NEUROLOGY

## 2022-05-03 RX ORDER — ROSUVASTATIN CALCIUM 5 MG/1
5 TABLET, COATED ORAL DAILY
COMMUNITY
Start: 2022-04-01 | End: 2022-06-15

## 2022-05-03 RX ORDER — CARBIDOPA AND LEVODOPA 10; 100 MG/1; MG/1
TABLET ORAL
Qty: 90 TABLET | Refills: 2 | Status: SHIPPED | OUTPATIENT
Start: 2022-05-03 | End: 2022-06-15 | Stop reason: SDUPTHER

## 2022-05-03 NOTE — LETTER
5/4/2022    Patient: Kristina Chen   YOB: 1933   Date of Visit: [facility], [address] [place] [address] [address] [place] [phone]  Via Fax: [phone]    Dear Vandana Franz MD,      Thank you for referring Mr. Kristina Chen to [facility] for evaluation. My notes for this consultation are attached. If you have questions, please do not hesitate to call me. I look forward to following your patient along with you.       Sincerely,    Malina Strong MD
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
## 2022-05-04 RX ORDER — CHOLECALCIFEROL TAB 125 MCG (5000 UNIT) 125 MCG
TAB ORAL
COMMUNITY

## 2022-05-04 RX ORDER — ASPIRIN 81 MG/1
TABLET ORAL
COMMUNITY

## 2022-06-15 ENCOUNTER — OFFICE VISIT (OUTPATIENT)
Dept: NEUROLOGY | Age: 87
End: 2022-06-15
Payer: MEDICARE

## 2022-06-15 VITALS — SYSTOLIC BLOOD PRESSURE: 118 MMHG | DIASTOLIC BLOOD PRESSURE: 86 MMHG | RESPIRATION RATE: 20 BRPM

## 2022-06-15 DIAGNOSIS — G21.4 VASCULAR PARKINSONISM (HCC): ICD-10-CM

## 2022-06-15 DIAGNOSIS — F01.50 VASCULAR DEMENTIA WITHOUT BEHAVIORAL DISTURBANCE (HCC): Primary | ICD-10-CM

## 2022-06-15 PROCEDURE — G8536 NO DOC ELDER MAL SCRN: HCPCS | Performed by: PSYCHIATRY & NEUROLOGY

## 2022-06-15 PROCEDURE — 1123F ACP DISCUSS/DSCN MKR DOCD: CPT | Performed by: PSYCHIATRY & NEUROLOGY

## 2022-06-15 PROCEDURE — G8427 DOCREV CUR MEDS BY ELIG CLIN: HCPCS | Performed by: PSYCHIATRY & NEUROLOGY

## 2022-06-15 PROCEDURE — G8432 DEP SCR NOT DOC, RNG: HCPCS | Performed by: PSYCHIATRY & NEUROLOGY

## 2022-06-15 PROCEDURE — 99214 OFFICE O/P EST MOD 30 MIN: CPT | Performed by: PSYCHIATRY & NEUROLOGY

## 2022-06-15 PROCEDURE — G8417 CALC BMI ABV UP PARAM F/U: HCPCS | Performed by: PSYCHIATRY & NEUROLOGY

## 2022-06-15 PROCEDURE — 1101F PT FALLS ASSESS-DOCD LE1/YR: CPT | Performed by: PSYCHIATRY & NEUROLOGY

## 2022-06-15 RX ORDER — CARBIDOPA AND LEVODOPA 10; 100 MG/1; MG/1
1 TABLET ORAL 3 TIMES DAILY
Qty: 90 TABLET | Refills: 5 | Status: SHIPPED | OUTPATIENT
Start: 2022-06-15

## 2022-06-15 RX ORDER — VALSARTAN AND HYDROCHLOROTHIAZIDE 160; 12.5 MG/1; MG/1
1 TABLET, FILM COATED ORAL DAILY
COMMUNITY
Start: 2022-05-05
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
## 2024-06-13 ENCOUNTER — APPOINTMENT (OUTPATIENT)
Facility: HOSPITAL | Age: 89
DRG: 377 | End: 2024-06-13
Payer: MEDICARE

## 2024-06-13 ENCOUNTER — HOSPITAL ENCOUNTER (INPATIENT)
Facility: HOSPITAL | Age: 89
LOS: 10 days | Discharge: HOME HEALTH | DRG: 377 | End: 2024-06-23
Attending: STUDENT IN AN ORGANIZED HEALTH CARE EDUCATION/TRAINING PROGRAM | Admitting: INTERNAL MEDICINE
Payer: MEDICARE

## 2024-06-13 DIAGNOSIS — D64.9 SYMPTOMATIC ANEMIA: ICD-10-CM

## 2024-06-13 DIAGNOSIS — I27.82 CHRONIC PULMONARY EMBOLISM, UNSPECIFIED PULMONARY EMBOLISM TYPE, UNSPECIFIED WHETHER ACUTE COR PULMONALE PRESENT (HCC): ICD-10-CM

## 2024-06-13 DIAGNOSIS — K92.2 GASTROINTESTINAL HEMORRHAGE, UNSPECIFIED GASTROINTESTINAL HEMORRHAGE TYPE: Primary | ICD-10-CM

## 2024-06-13 LAB
ALBUMIN SERPL-MCNC: 3.5 G/DL (ref 3.5–5)
ALBUMIN/GLOB SERPL: 0.9 (ref 1.1–2.2)
ALP SERPL-CCNC: 61 U/L (ref 45–117)
ALT SERPL-CCNC: 13 U/L (ref 12–78)
ANION GAP SERPL CALC-SCNC: 12 MMOL/L (ref 5–15)
APPEARANCE UR: CLEAR
AST SERPL-CCNC: 16 U/L (ref 15–37)
BACTERIA URNS QL MICRO: NEGATIVE /HPF
BASOPHILS # BLD: 0 K/UL (ref 0–0.1)
BASOPHILS # BLD: 0 K/UL (ref 0–0.1)
BASOPHILS NFR BLD: 1 % (ref 0–1)
BASOPHILS NFR BLD: 1 % (ref 0–1)
BILIRUB SERPL-MCNC: 0.6 MG/DL (ref 0.2–1)
BILIRUB UR QL: NEGATIVE
BUN SERPL-MCNC: 49 MG/DL (ref 6–20)
BUN/CREAT SERPL: 31 (ref 12–20)
CALCIUM SERPL-MCNC: 9.1 MG/DL (ref 8.5–10.1)
CHLORIDE SERPL-SCNC: 104 MMOL/L (ref 97–108)
CO2 SERPL-SCNC: 25 MMOL/L (ref 21–32)
COLOR UR: ABNORMAL
COMMENT:: NORMAL
COMMENT:: NORMAL
CREAT SERPL-MCNC: 1.58 MG/DL (ref 0.7–1.3)
DIFFERENTIAL METHOD BLD: ABNORMAL
DIFFERENTIAL METHOD BLD: ABNORMAL
EOSINOPHIL # BLD: 0 K/UL (ref 0–0.4)
EOSINOPHIL # BLD: 0.1 K/UL (ref 0–0.4)
EOSINOPHIL NFR BLD: 1 % (ref 0–7)
EOSINOPHIL NFR BLD: 3 % (ref 0–7)
EPITH CASTS URNS QL MICRO: ABNORMAL /LPF
ERYTHROCYTE [DISTWIDTH] IN BLOOD BY AUTOMATED COUNT: 13.8 % (ref 11.5–14.5)
ERYTHROCYTE [DISTWIDTH] IN BLOOD BY AUTOMATED COUNT: 13.8 % (ref 11.5–14.5)
GLOBULIN SER CALC-MCNC: 3.9 G/DL (ref 2–4)
GLUCOSE SERPL-MCNC: 107 MG/DL (ref 65–100)
GLUCOSE UR STRIP.AUTO-MCNC: NEGATIVE MG/DL
HCT VFR BLD AUTO: 26.7 % (ref 36.6–50.3)
HCT VFR BLD AUTO: 29 % (ref 36.6–50.3)
HEMOCCULT STL QL: POSITIVE
HGB BLD-MCNC: 9 G/DL (ref 12.1–17)
HGB BLD-MCNC: 9.7 G/DL (ref 12.1–17)
HGB UR QL STRIP: NEGATIVE
IMM GRANULOCYTES # BLD AUTO: 0 K/UL (ref 0–0.04)
IMM GRANULOCYTES # BLD AUTO: 0.1 K/UL (ref 0–0.04)
IMM GRANULOCYTES NFR BLD AUTO: 1 % (ref 0–0.5)
IMM GRANULOCYTES NFR BLD AUTO: 1 % (ref 0–0.5)
KETONES UR QL STRIP.AUTO: NEGATIVE MG/DL
LEUKOCYTE ESTERASE UR QL STRIP.AUTO: NEGATIVE
LIPASE SERPL-CCNC: 42 U/L (ref 13–75)
LYMPHOCYTES # BLD: 1.1 K/UL (ref 0.8–3.5)
LYMPHOCYTES # BLD: 1.5 K/UL (ref 0.8–3.5)
LYMPHOCYTES NFR BLD: 24 % (ref 12–49)
LYMPHOCYTES NFR BLD: 35 % (ref 12–49)
MAGNESIUM SERPL-MCNC: 1.9 MG/DL (ref 1.6–2.4)
MCH RBC QN AUTO: 29.8 PG (ref 26–34)
MCH RBC QN AUTO: 30 PG (ref 26–34)
MCHC RBC AUTO-ENTMCNC: 33.4 G/DL (ref 30–36.5)
MCHC RBC AUTO-ENTMCNC: 33.7 G/DL (ref 30–36.5)
MCV RBC AUTO: 89 FL (ref 80–99)
MCV RBC AUTO: 89.2 FL (ref 80–99)
MONOCYTES # BLD: 0.5 K/UL (ref 0–1)
MONOCYTES # BLD: 0.6 K/UL (ref 0–1)
MONOCYTES NFR BLD: 10 % (ref 5–13)
MONOCYTES NFR BLD: 13 % (ref 5–13)
NEUTS SEG # BLD: 2.1 K/UL (ref 1.8–8)
NEUTS SEG # BLD: 3.1 K/UL (ref 1.8–8)
NEUTS SEG NFR BLD: 49 % (ref 32–75)
NEUTS SEG NFR BLD: 63 % (ref 32–75)
NITRITE UR QL STRIP.AUTO: NEGATIVE
NRBC # BLD: 0 K/UL (ref 0–0.01)
NRBC # BLD: 0 K/UL (ref 0–0.01)
NRBC BLD-RTO: 0 PER 100 WBC
NRBC BLD-RTO: 0 PER 100 WBC
PH UR STRIP: 6 (ref 5–8)
PLATELET # BLD AUTO: 138 K/UL (ref 150–400)
PLATELET # BLD AUTO: 143 K/UL (ref 150–400)
PMV BLD AUTO: 11.7 FL (ref 8.9–12.9)
PMV BLD AUTO: 12 FL (ref 8.9–12.9)
POTASSIUM SERPL-SCNC: 3.9 MMOL/L (ref 3.5–5.1)
PROT SERPL-MCNC: 7.4 G/DL (ref 6.4–8.2)
PROT UR STRIP-MCNC: NEGATIVE MG/DL
RBC # BLD AUTO: 3 M/UL (ref 4.1–5.7)
RBC # BLD AUTO: 3.25 M/UL (ref 4.1–5.7)
RBC #/AREA URNS HPF: ABNORMAL /HPF (ref 0–5)
SODIUM SERPL-SCNC: 141 MMOL/L (ref 136–145)
SP GR UR REFRACTOMETRY: >1.03 (ref 1–1.03)
SPECIMEN HOLD: NORMAL
SPECIMEN HOLD: NORMAL
UROBILINOGEN UR QL STRIP.AUTO: 0.2 EU/DL (ref 0.2–1)
WBC # BLD AUTO: 4.4 K/UL (ref 4.1–11.1)
WBC # BLD AUTO: 4.9 K/UL (ref 4.1–11.1)
WBC URNS QL MICRO: ABNORMAL /HPF (ref 0–4)

## 2024-06-13 PROCEDURE — 6360000002 HC RX W HCPCS: Performed by: STUDENT IN AN ORGANIZED HEALTH CARE EDUCATION/TRAINING PROGRAM

## 2024-06-13 PROCEDURE — 36415 COLL VENOUS BLD VENIPUNCTURE: CPT

## 2024-06-13 PROCEDURE — 1100000000 HC RM PRIVATE

## 2024-06-13 PROCEDURE — 85025 COMPLETE CBC W/AUTO DIFF WBC: CPT

## 2024-06-13 PROCEDURE — 99285 EMERGENCY DEPT VISIT HI MDM: CPT

## 2024-06-13 PROCEDURE — 83690 ASSAY OF LIPASE: CPT

## 2024-06-13 PROCEDURE — 82272 OCCULT BLD FECES 1-3 TESTS: CPT

## 2024-06-13 PROCEDURE — 80053 COMPREHEN METABOLIC PANEL: CPT

## 2024-06-13 PROCEDURE — 81001 URINALYSIS AUTO W/SCOPE: CPT

## 2024-06-13 PROCEDURE — 74177 CT ABD & PELVIS W/CONTRAST: CPT

## 2024-06-13 PROCEDURE — C9113 INJ PANTOPRAZOLE SODIUM, VIA: HCPCS | Performed by: STUDENT IN AN ORGANIZED HEALTH CARE EDUCATION/TRAINING PROGRAM

## 2024-06-13 PROCEDURE — 83735 ASSAY OF MAGNESIUM: CPT

## 2024-06-13 PROCEDURE — 71045 X-RAY EXAM CHEST 1 VIEW: CPT

## 2024-06-13 PROCEDURE — 93005 ELECTROCARDIOGRAM TRACING: CPT | Performed by: STUDENT IN AN ORGANIZED HEALTH CARE EDUCATION/TRAINING PROGRAM

## 2024-06-13 PROCEDURE — 2580000003 HC RX 258: Performed by: STUDENT IN AN ORGANIZED HEALTH CARE EDUCATION/TRAINING PROGRAM

## 2024-06-13 RX ORDER — ACETAMINOPHEN 325 MG/1
650 TABLET ORAL EVERY 6 HOURS PRN
Status: DISCONTINUED | OUTPATIENT
Start: 2024-06-13 | End: 2024-06-23 | Stop reason: HOSPADM

## 2024-06-13 RX ORDER — IOPAMIDOL 612 MG/ML
100 INJECTION, SOLUTION INTRATHECAL
Status: DISCONTINUED | OUTPATIENT
Start: 2024-06-13 | End: 2024-06-19

## 2024-06-13 RX ORDER — POLYETHYLENE GLYCOL 3350 17 G/17G
17 POWDER, FOR SOLUTION ORAL DAILY PRN
Status: DISCONTINUED | OUTPATIENT
Start: 2024-06-13 | End: 2024-06-23 | Stop reason: HOSPADM

## 2024-06-13 RX ORDER — SODIUM CHLORIDE 9 MG/ML
INJECTION, SOLUTION INTRAVENOUS CONTINUOUS
Status: DISCONTINUED | OUTPATIENT
Start: 2024-06-13 | End: 2024-06-14

## 2024-06-13 RX ORDER — SODIUM CHLORIDE 9 MG/ML
INJECTION, SOLUTION INTRAVENOUS PRN
Status: DISCONTINUED | OUTPATIENT
Start: 2024-06-13 | End: 2024-06-23 | Stop reason: HOSPADM

## 2024-06-13 RX ORDER — ONDANSETRON 4 MG/1
4 TABLET, ORALLY DISINTEGRATING ORAL EVERY 8 HOURS PRN
Status: DISCONTINUED | OUTPATIENT
Start: 2024-06-13 | End: 2024-06-23 | Stop reason: HOSPADM

## 2024-06-13 RX ORDER — ACETAMINOPHEN 650 MG/1
650 SUPPOSITORY RECTAL EVERY 6 HOURS PRN
Status: DISCONTINUED | OUTPATIENT
Start: 2024-06-13 | End: 2024-06-23 | Stop reason: HOSPADM

## 2024-06-13 RX ORDER — SODIUM CHLORIDE 0.9 % (FLUSH) 0.9 %
5-40 SYRINGE (ML) INJECTION PRN
Status: DISCONTINUED | OUTPATIENT
Start: 2024-06-13 | End: 2024-06-23 | Stop reason: HOSPADM

## 2024-06-13 RX ORDER — SODIUM CHLORIDE, SODIUM LACTATE, POTASSIUM CHLORIDE, AND CALCIUM CHLORIDE .6; .31; .03; .02 G/100ML; G/100ML; G/100ML; G/100ML
1000 INJECTION, SOLUTION INTRAVENOUS
Status: COMPLETED | OUTPATIENT
Start: 2024-06-13 | End: 2024-06-13

## 2024-06-13 RX ORDER — ONDANSETRON 2 MG/ML
4 INJECTION INTRAMUSCULAR; INTRAVENOUS EVERY 6 HOURS PRN
Status: DISCONTINUED | OUTPATIENT
Start: 2024-06-13 | End: 2024-06-23 | Stop reason: HOSPADM

## 2024-06-13 RX ORDER — SODIUM CHLORIDE 0.9 % (FLUSH) 0.9 %
5-40 SYRINGE (ML) INJECTION EVERY 12 HOURS SCHEDULED
Status: DISCONTINUED | OUTPATIENT
Start: 2024-06-13 | End: 2024-06-23 | Stop reason: HOSPADM

## 2024-06-13 RX ADMIN — SODIUM CHLORIDE, POTASSIUM CHLORIDE, SODIUM LACTATE AND CALCIUM CHLORIDE 1000 ML: 600; 310; 30; 20 INJECTION, SOLUTION INTRAVENOUS at 20:56

## 2024-06-13 RX ADMIN — PANTOPRAZOLE SODIUM 80 MG: 40 INJECTION, POWDER, FOR SOLUTION INTRAVENOUS at 23:45

## 2024-06-13 ASSESSMENT — PAIN - FUNCTIONAL ASSESSMENT: PAIN_FUNCTIONAL_ASSESSMENT: NONE - DENIES PAIN

## 2024-06-14 ENCOUNTER — ANESTHESIA EVENT (OUTPATIENT)
Facility: HOSPITAL | Age: 89
End: 2024-06-14
Payer: MEDICARE

## 2024-06-14 ENCOUNTER — ANESTHESIA (OUTPATIENT)
Facility: HOSPITAL | Age: 89
End: 2024-06-14
Payer: MEDICARE

## 2024-06-14 LAB
COMMENT:: NORMAL
EKG ATRIAL RATE: 55 BPM
EKG DIAGNOSIS: NORMAL
EKG P AXIS: 37 DEGREES
EKG P-R INTERVAL: 186 MS
EKG Q-T INTERVAL: 438 MS
EKG QRS DURATION: 86 MS
EKG QTC CALCULATION (BAZETT): 419 MS
EKG R AXIS: -4 DEGREES
EKG T AXIS: 10 DEGREES
EKG VENTRICULAR RATE: 55 BPM
HCT VFR BLD AUTO: 22.2 % (ref 36.6–50.3)
HCT VFR BLD AUTO: 22.3 % (ref 36.6–50.3)
HCT VFR BLD AUTO: 24.4 % (ref 36.6–50.3)
HGB BLD-MCNC: 7.5 G/DL (ref 12.1–17)
HGB BLD-MCNC: 7.5 G/DL (ref 12.1–17)
HGB BLD-MCNC: 8.2 G/DL (ref 12.1–17)
SPECIMEN HOLD: NORMAL

## 2024-06-14 PROCEDURE — 3700000000 HC ANESTHESIA ATTENDED CARE: Performed by: INTERNAL MEDICINE

## 2024-06-14 PROCEDURE — 85018 HEMOGLOBIN: CPT

## 2024-06-14 PROCEDURE — 7100000011 HC PHASE II RECOVERY - ADDTL 15 MIN: Performed by: INTERNAL MEDICINE

## 2024-06-14 PROCEDURE — 93010 ELECTROCARDIOGRAM REPORT: CPT | Performed by: SPECIALIST

## 2024-06-14 PROCEDURE — 6360000002 HC RX W HCPCS: Performed by: INTERNAL MEDICINE

## 2024-06-14 PROCEDURE — C9113 INJ PANTOPRAZOLE SODIUM, VIA: HCPCS | Performed by: INTERNAL MEDICINE

## 2024-06-14 PROCEDURE — 6370000000 HC RX 637 (ALT 250 FOR IP): Performed by: INTERNAL MEDICINE

## 2024-06-14 PROCEDURE — 3600007502: Performed by: INTERNAL MEDICINE

## 2024-06-14 PROCEDURE — 3700000001 HC ADD 15 MINUTES (ANESTHESIA): Performed by: INTERNAL MEDICINE

## 2024-06-14 PROCEDURE — 7100000010 HC PHASE II RECOVERY - FIRST 15 MIN: Performed by: INTERNAL MEDICINE

## 2024-06-14 PROCEDURE — 94761 N-INVAS EAR/PLS OXIMETRY MLT: CPT

## 2024-06-14 PROCEDURE — 3600007512: Performed by: INTERNAL MEDICINE

## 2024-06-14 PROCEDURE — 2580000003 HC RX 258: Performed by: INTERNAL MEDICINE

## 2024-06-14 PROCEDURE — 1100000000 HC RM PRIVATE

## 2024-06-14 PROCEDURE — 85014 HEMATOCRIT: CPT

## 2024-06-14 PROCEDURE — 6360000002 HC RX W HCPCS: Performed by: NURSE ANESTHETIST, CERTIFIED REGISTERED

## 2024-06-14 PROCEDURE — 0DJ08ZZ INSPECTION OF UPPER INTESTINAL TRACT, VIA NATURAL OR ARTIFICIAL OPENING ENDOSCOPIC: ICD-10-PCS | Performed by: INTERNAL MEDICINE

## 2024-06-14 PROCEDURE — 36415 COLL VENOUS BLD VENIPUNCTURE: CPT

## 2024-06-14 PROCEDURE — 2709999900 HC NON-CHARGEABLE SUPPLY: Performed by: INTERNAL MEDICINE

## 2024-06-14 RX ORDER — BISACODYL 5 MG/1
10 TABLET, DELAYED RELEASE ORAL DAILY
Status: COMPLETED | OUTPATIENT
Start: 2024-06-14 | End: 2024-06-16

## 2024-06-14 RX ORDER — SODIUM CHLORIDE, SODIUM LACTATE, POTASSIUM CHLORIDE, AND CALCIUM CHLORIDE .6; .31; .03; .02 G/100ML; G/100ML; G/100ML; G/100ML
1000 INJECTION, SOLUTION INTRAVENOUS ONCE
Status: COMPLETED | OUTPATIENT
Start: 2024-06-14 | End: 2024-06-14

## 2024-06-14 RX ORDER — METOPROLOL SUCCINATE 25 MG/1
25 TABLET, EXTENDED RELEASE ORAL DAILY
Status: DISCONTINUED | OUTPATIENT
Start: 2024-06-15 | End: 2024-06-23 | Stop reason: HOSPADM

## 2024-06-14 RX ORDER — SODIUM CHLORIDE 9 MG/ML
INJECTION, SOLUTION INTRAVENOUS CONTINUOUS
Status: DISCONTINUED | OUTPATIENT
Start: 2024-06-14 | End: 2024-06-21

## 2024-06-14 RX ORDER — PROPOFOL 10 MG/ML
INJECTION, EMULSION INTRAVENOUS CONTINUOUS PRN
Status: DISCONTINUED | OUTPATIENT
Start: 2024-06-14 | End: 2024-06-17 | Stop reason: SDUPTHER

## 2024-06-14 RX ADMIN — SODIUM CHLORIDE, PRESERVATIVE FREE 10 ML: 5 INJECTION INTRAVENOUS at 21:20

## 2024-06-14 RX ADMIN — PROPOFOL 60 MG: 10 INJECTION, EMULSION INTRAVENOUS at 16:33

## 2024-06-14 RX ADMIN — BISACODYL 10 MG: 5 TABLET, COATED ORAL at 18:18

## 2024-06-14 RX ADMIN — SODIUM CHLORIDE, POTASSIUM CHLORIDE, SODIUM LACTATE AND CALCIUM CHLORIDE 1000 ML: 600; 310; 30; 20 INJECTION, SOLUTION INTRAVENOUS at 02:49

## 2024-06-14 RX ADMIN — SODIUM CHLORIDE: 9 INJECTION, SOLUTION INTRAVENOUS at 01:39

## 2024-06-14 RX ADMIN — CARBIDOPA AND LEVODOPA 1 TABLET: 10; 100 TABLET ORAL at 01:38

## 2024-06-14 RX ADMIN — PROPOFOL 40 MCG/KG/MIN: 10 INJECTION, EMULSION INTRAVENOUS at 16:32

## 2024-06-14 RX ADMIN — SODIUM CHLORIDE: 9 INJECTION, SOLUTION INTRAVENOUS at 14:39

## 2024-06-14 RX ADMIN — SODIUM CHLORIDE, PRESERVATIVE FREE 10 ML: 5 INJECTION INTRAVENOUS at 10:19

## 2024-06-14 RX ADMIN — CARBIDOPA AND LEVODOPA 1 TABLET: 10; 100 TABLET ORAL at 10:20

## 2024-06-14 RX ADMIN — SODIUM CHLORIDE, PRESERVATIVE FREE 40 MG: 5 INJECTION INTRAVENOUS at 21:20

## 2024-06-14 RX ADMIN — CARBIDOPA AND LEVODOPA 1 TABLET: 10; 100 TABLET ORAL at 21:20

## 2024-06-14 RX ADMIN — CHOLECALCIFEROL TAB 125 MCG (5000 UNIT) 5000 UNITS: 125 TAB at 10:20

## 2024-06-14 RX ADMIN — SODIUM CHLORIDE, PRESERVATIVE FREE 40 MG: 5 INJECTION INTRAVENOUS at 10:19

## 2024-06-14 ASSESSMENT — PAIN - FUNCTIONAL ASSESSMENT: PAIN_FUNCTIONAL_ASSESSMENT: 0-10

## 2024-06-14 ASSESSMENT — PAIN SCALES - GENERAL: PAINLEVEL_OUTOF10: 2

## 2024-06-14 NOTE — ED PROVIDER NOTES
92/53 98.6 °F (37 °C) Oral 54 14 99 % 1.702 m (5' 7\") 77.1 kg (170 lb)       Body mass index is 26.63 kg/m².    Physical Exam    See [facility]    DIAGNOSTIC RESULTS     EKG: All EKG's are interpreted by the Emergency Department Physician who either signs or Co-signs this chart in the absence of a cardiologist.        RADIOLOGY:   Non-plain film images such as CT, Ultrasound and MRI are read by the radiologist.    Interpretation per the Radiologist below, if available at the time of this note:    CT ABDOMEN PELVIS W IV CONTRAST Additional Contrast? None   Final Result   Nonspecific mild mucosal thickening at the splenic flexure and mid sigmoid   colon.   See report for other findings.      Electronically signed by Ming Brumfield MD      XR CHEST PORTABLE   Final Result      Bibasilar atelectasis, possible left basilar infiltrate.         Electronically signed by RICKY HERNANDEZ           LABS:  Labs Reviewed   CBC WITH AUTO DIFFERENTIAL - Abnormal; Notable for the following components:       Result Value    RBC 3.25 (*)     Hemoglobin 9.7 (*)     Hematocrit 29.0 (*)     Platelets 143 (*)     Immature Granulocytes % 1 (*)     Immature Granulocytes Absolute 0.1 (*)     All other components within normal limits   COMPREHENSIVE METABOLIC PANEL - Abnormal; Notable for the following components:    Glucose 107 (*)     BUN 49 (*)     Creatinine 1.58 (*)     BUN/Creatinine Ratio 31 (*)     Est, Glom Filt Rate 41 (*)     Albumin/Globulin Ratio 0.9 (*)     All other components within normal limits   URINALYSIS WITH MICROSCOPIC - Abnormal; Notable for the following components:    Specific Gravity, UA >1.030 (*)     All other components within normal limits   MAGNESIUM   LIPASE   EXTRA TUBES HOLD   OCCULT BLOOD, FECAL   EXTRA TUBES HOLD       All other labs were within normal range or not returned as of this dictation.        PROCEDURES:  Unless otherwise noted below, none     Procedures    See [facility] for documentation of critical care

## 2024-06-14 NOTE — ACP (ADVANCE CARE PLANNING)
heart when there is a sudden event, like a heart attack, in someone who is otherwise healthy. Unfortunately, CPR does not typically restart the heart for people who have serious health conditions or who are very sick.\"     \"In the event your heart stopped as a result of an underlying serious health condition, would you want attempts to be made to restart your heart (answer \"yes\" for attempt to resuscitate) or would you prefer a natural death (answer \"no\" for do not attempt to resuscitate)?\" Yes    Patient made it very clear to me that he would want to be resuscitated in the event of cardiopulmonary arrest, including chest compressions, defibrillation, intubation/mechanical ventilation.      Interventions Provided:  Referral made for ACP follow-up assistance to: Palliative care

## 2024-06-14 NOTE — CONSULTS
Mercedes Huddleston PA-C                       [phone] office             Monday-Friday 8:00 am-4:30 pm  I am not permitted to use \"perfect serve\" use above for contact, thanks.      Gastroenterology Consultation Note      Admit Date: 6/13/2024  Consult Date: 6/14/2024   I greatly appreciate your asking me to see Sloan Sarkar, thank you very much for the opportunity to participate in his care.    Narrative Assessment and Plan   89 yo M admitted with anemia, hgb 9.7 on admission from prior baseline around 14. He has had diarrhea for several days that has been reported as dark, and melena confirmed on digital rectal exam. Denies abd pain, N/V. Last colonoscopy was in his 60s, his daughter recalls it was likely normal. No prior EGD. On aspirin 81 mg daily.     Impression:  Anemia  Melena    Plan:   EGD this afternoon with Dr. Story for further assessment of GI bleeding with melena. Please keep patient NPO, continue IV pantoprazole 40 mg BID.  Discussed with his daughter recent plans for outpatient CT colonography, more recommendations for appropriate inpatient imaging to follow after EGD today.   Mercedes Huddleston PA-C    Subjective:     Chief Complaint: anemia, melena     History of Present Illness: Mr. Sarkar is a 89 yo male past medical history of Parkinson's dementia, prior lacunar infarction, HTN, HLD, prior history of prostate cancer, admitted with anemia and melena x4-5 days. His daughter Eva is POA and provides the majority of the history. He had been struggling with constipation, recently established care with Dr. Zepeda from [facility], had been plannign to have CT colonography done, and was started on Amitiza for constipation; he then developed diarrhea and they were working on adjusting his dose. His daughter saw he diarrhea was darker but did not see much blood in it. In ED, stool was black with maroon. Denies abd pain, N/V. On

## 2024-06-14 NOTE — PROGRESS NOTES
Patient had BM which was bloody, dark and bright red blood with clots from the rectum. BP was also low. MD notified and order obtained for lactated ringers bolus and labs for H&H. Patient was also seen by MD.

## 2024-06-14 NOTE — PROGRESS NOTES
TRANSFER - OUT REPORT:    Verbal report given to Yumiko spivey on Sloan Sarkar  being transferred to [facility] for routine progression of patient care       Report consisted of patient's Situation, Background, Assessment and   Recommendations(SBAR).     Information from the following report(s) Nurse Handoff Report was reviewed with the receiving nurse.           Lines:   Peripheral IV 06/13/24 Right;Anterior Forearm (Active)   Site Assessment Clean, dry & intact 06/14/24 0759   Line Status Infusing 06/14/24 0759   Line Care Connections checked and tightened 06/14/24 0759   Phlebitis Assessment No symptoms 06/14/24 0759   Infiltration Assessment 0 06/14/24 0759   Alcohol Cap Used Yes 06/14/24 0759   Dressing Status Clean, dry & intact 06/14/24 0759   Dressing Type Transparent 06/14/24 0759        Opportunity for questions and clarification was provided.      Patient transported with:  Monitor tele

## 2024-06-14 NOTE — ANESTHESIA PRE PROCEDURE
Department of Anesthesiology  Preprocedure Note       Name:  Sloan Sarkar   Age:  89 y.o.  :  1933                                          MRN:  [id number]         Date:  2024      Surgeon: Surgeon(s):  Felix Story MD    Procedure: Procedure(s):  ESOPHAGOGASTRODUODENOSCOPY    Medications prior to admission:   Prior to Admission medications    Medication Sig Start Date End Date Taking? Authorizing Provider   aspirin 81 MG EC tablet aspirin 81 mg tablet,delayed release   Take 1 tablet every day by oral route.    Automatic Reconciliation, Ar   carbidopa-levodopa (SINEMET)  MG per tablet Take 1 tablet by mouth 3 times daily 6/15/22   Automatic Reconciliation, Ar   vitamin D3 (CHOLECALCIFEROL) 125 MCG (5000 UT) TABS tablet Vitamin D3 125 mcg (5,000 unit) tablet   Take 1 tablet every day by oral route.    Automatic Reconciliation, Ar   metoprolol tartrate (LOPRESSOR) 25 MG tablet Take 25 mg by mouth 2 times daily    Automatic Reconciliation, Ar   valsartan-hydroCHLOROthiazide (DIOVAN-HCT) 160-12.5 MG per tablet Take 1 tablet by mouth daily 22   Automatic Reconciliation, Ar       Current medications:    Current Facility-Administered Medications   Medication Dose Route Frequency Provider Last Rate Last Admin    0.9 % sodium chloride infusion   IntraVENous Continuous Javed Gold  mL/hr at 24 1615 NoRateChange at 24 1615    iopamidol (ISOVUE-M 300) 61 % injection 100 mL  100 mL Intrathecal ONCE PRN Mariposa Sanon, DO        sodium chloride flush 0.9 % injection 5-40 mL  5-40 mL IntraVENous 2 times per day Hdz, Kapiliah H, DO   10 mL at 24 1019    sodium chloride flush 0.9 % injection 5-40 mL  5-40 mL IntraVENous PRN Kapil Hdziaalex H, DO        0.9 % sodium chloride infusion   IntraVENous PRN Kapil Hdziah H, DO        ondansetron (ZOFRAN-ODT) disintegrating tablet 4 mg  4 mg Oral Q8H PRN Hdz, Kapiliaalex H, DO        Or    ondansetron (ZOFRAN) injection 4 mg  4 mg IntraVENous

## 2024-06-14 NOTE — CARE COORDINATION
6/14/2024 1:30 PM       Care Management Initial Assessment  6/14/2024 1:31 PM  If patient is discharged prior to next notation, then this note serves as note for discharge by case management.    Reason for Admission:   GI (gastrointestinal hemorrhage) [K92.2]  Symptomatic anemia [D64.9]  Gastrointestinal hemorrhage, unspecified gastrointestinal hemorrhage type [K92.2]         Patient Admission Status: Inpatient  RUR: Readmission Risk Score: 13.4    Hospitalization in the last 30 days (Readmission):  No        Advance Care Planning:  Code Status: Full Code  Primary Healthcare Decision Maker: Named in Scanned ACP Document   Advance Directive: Power of  for healthcare on file, has an advanced directive - a copy has been provided     __________________________________________________________________________  Assessment:      06/14/24 1326   Service Assessment   Patient Orientation Alert and Oriented   Cognition Alert   History Provided By Patient;Child/Family   Primary Caregiver Self   Support Systems Children   Patient's Healthcare Decision Maker is: Named in Scanned ACP Document   PCP Verified by CM Yes   Last Visit to PCP Within last 6 months   Prior Functional Level Assistance with the following:;Housework;Cooking   Can patient return to prior living arrangement Yes   Ability to make needs known: Good   Family able to assist with home care needs: Yes   Would you like for me to discuss the discharge plan with any other family members/significant others, and if so, who? Yes   Financial Resources Medicare;Other (Comment)   Community Resources None   Social/Functional History   Lives With Son  (Daughter in law)   Type of Home House   Home Equipment Walker - Rolling  (Walking stick)   Receives Help From Family   ADL Assistance Independent   Ambulation Assistance Independent  (with walking stick)   Transfer Assistance Independent   Active  No   Discharge Planning   Type of Residence House   Living

## 2024-06-14 NOTE — ED NOTES
TRANSFER - OUT REPORT:    Verbal report given to Deja on Sloan Sarkar  being transferred to [facility] for routine progression of patient care       Report consisted of patient's Situation, Background, Assessment and   Recommendations(SBAR).     Information from the following report(s) ED Encounter Summary, ED SBAR, and MAR was reviewed with the receiving nurse.    Joffre Fall Assessment:    Presents to emergency department  because of falls (Syncope, seizure, or loss of consciousness): No  Age > 70: Yes     Impaired Mobility: Ambulates or transfers with assistive devices or assistance; Unable to ambulate or transer.: Yes  Nursing Judgement: Yes          Lines:       Opportunity for questions and clarification was provided.      Patient transported with:  [facility]

## 2024-06-14 NOTE — PROGRESS NOTES

## 2024-06-14 NOTE — PROGRESS NOTES
[facility]  [address] [place] [address] [phone]      Hospitalist Progress Note      NAME: Sloan Sarkar   :  1933  MRM:  [id number]    Date of service: 2024  11:38 AM       Assessment and Plan:   GI bleed/melena.  Suspect upper GI bleeding.  Continue Protonix 40 mg IV twice daily.  GI consulted. Keep NPO, IVF.     2.  Acute blood loss anemia due to GI bleed. Monitor H/H closley and transfuse as needed.     3.  Hypotension. Received bolus in ER. Continue NS maintenance.      4.  Parkinson's disease.  Continue home medication of carbidopa-levodopa  mg p.o. 3 times daily.         Subjective:     Chief Complaint:: Patient was seen and examined as a follow up for GI bleed.  Chart was reviewed. this morning had bowl movement not bloody     ROS:  (bold if positive, if negative)    Tolerating PT  Tolerating Diet        Objective:     Last 24hrs VS reviewed since prior progress note. Most recent are:    Vitals:    24 1040   BP: 110/72   Pulse: 58   Resp: 16   Temp: 98.2 °F (36.8 °C)   SpO2:      SpO2 Readings from Last 6 Encounters:   24 99%          Intake/Output Summary (Last 24 hours) at 2024 1138  Last data filed at 2024 0953  Gross per 24 hour   Intake 0 ml   Output --   Net 0 ml        Physical Exam:    Gen:  Well-developed, well-nourished, in no acute distress  HEENT:  Pink conjunctivae, PERRL, hearing intact to voice, moist mucous membranes  Neck:  Supple, without masses, thyroid non-tender  Resp:  No accessory muscle use, clear breath sounds without wheezes rales or rhonchi  Card:  No murmurs, normal S1, S2 without thrills, bruits or peripheral edema  Abd:  Soft, non-tender, non-distended, normoactive bowel sounds are present, no palpable organomegaly and no detectable hernias  Lymph:  No cervical or inguinal adenopathy  Musc:  No cyanosis or clubbing  Skin:  No rashes or ulcers, skin turgor is good  Neuro:  Cranial nerves

## 2024-06-15 LAB
ANION GAP SERPL CALC-SCNC: 4 MMOL/L (ref 5–15)
BASOPHILS # BLD: 0 K/UL (ref 0–0.1)
BASOPHILS NFR BLD: 1 % (ref 0–1)
BUN SERPL-MCNC: 31 MG/DL (ref 6–20)
BUN/CREAT SERPL: 25 (ref 12–20)
CALCIUM SERPL-MCNC: 8.3 MG/DL (ref 8.5–10.1)
CHLORIDE SERPL-SCNC: 118 MMOL/L (ref 97–108)
CO2 SERPL-SCNC: 23 MMOL/L (ref 21–32)
CREAT SERPL-MCNC: 1.23 MG/DL (ref 0.7–1.3)
DIFFERENTIAL METHOD BLD: ABNORMAL
EOSINOPHIL # BLD: 0.1 K/UL (ref 0–0.4)
EOSINOPHIL NFR BLD: 3 % (ref 0–7)
ERYTHROCYTE [DISTWIDTH] IN BLOOD BY AUTOMATED COUNT: 14 % (ref 11.5–14.5)
GLUCOSE SERPL-MCNC: 92 MG/DL (ref 65–100)
HCT VFR BLD AUTO: 19.7 % (ref 36.6–50.3)
HCT VFR BLD AUTO: 21.2 % (ref 36.6–50.3)
HCT VFR BLD AUTO: 22.5 % (ref 36.6–50.3)
HGB BLD-MCNC: 6.6 G/DL (ref 12.1–17)
HGB BLD-MCNC: 7 G/DL (ref 12.1–17)
HGB BLD-MCNC: 7.4 G/DL (ref 12.1–17)
HISTORY CHECK: NORMAL
IMM GRANULOCYTES # BLD AUTO: 0 K/UL (ref 0–0.04)
IMM GRANULOCYTES NFR BLD AUTO: 0 % (ref 0–0.5)
LYMPHOCYTES # BLD: 1.2 K/UL (ref 0.8–3.5)
LYMPHOCYTES NFR BLD: 23 % (ref 12–49)
MCH RBC QN AUTO: 30.6 PG (ref 26–34)
MCHC RBC AUTO-ENTMCNC: 32.9 G/DL (ref 30–36.5)
MCV RBC AUTO: 93 FL (ref 80–99)
MONOCYTES # BLD: 0.7 K/UL (ref 0–1)
MONOCYTES NFR BLD: 13 % (ref 5–13)
NEUTS SEG # BLD: 3.2 K/UL (ref 1.8–8)
NEUTS SEG NFR BLD: 60 % (ref 32–75)
NRBC # BLD: 0 K/UL (ref 0–0.01)
NRBC BLD-RTO: 0 PER 100 WBC
PLATELET # BLD AUTO: 126 K/UL (ref 150–400)
PMV BLD AUTO: 10.9 FL (ref 8.9–12.9)
POTASSIUM SERPL-SCNC: 3.4 MMOL/L (ref 3.5–5.1)
RBC # BLD AUTO: 2.42 M/UL (ref 4.1–5.7)
SODIUM SERPL-SCNC: 145 MMOL/L (ref 136–145)
WBC # BLD AUTO: 5.3 K/UL (ref 4.1–11.1)

## 2024-06-15 PROCEDURE — 6360000002 HC RX W HCPCS: Performed by: NURSE PRACTITIONER

## 2024-06-15 PROCEDURE — 97161 PT EVAL LOW COMPLEX 20 MIN: CPT

## 2024-06-15 PROCEDURE — 6370000000 HC RX 637 (ALT 250 FOR IP): Performed by: INTERNAL MEDICINE

## 2024-06-15 PROCEDURE — 6360000002 HC RX W HCPCS

## 2024-06-15 PROCEDURE — 86900 BLOOD TYPING SEROLOGIC ABO: CPT

## 2024-06-15 PROCEDURE — 85025 COMPLETE CBC W/AUTO DIFF WBC: CPT

## 2024-06-15 PROCEDURE — 1100000000 HC RM PRIVATE

## 2024-06-15 PROCEDURE — 2580000003 HC RX 258: Performed by: NURSE PRACTITIONER

## 2024-06-15 PROCEDURE — 86901 BLOOD TYPING SEROLOGIC RH(D): CPT

## 2024-06-15 PROCEDURE — 85018 HEMOGLOBIN: CPT

## 2024-06-15 PROCEDURE — 2580000003 HC RX 258

## 2024-06-15 PROCEDURE — 94761 N-INVAS EAR/PLS OXIMETRY MLT: CPT

## 2024-06-15 PROCEDURE — 86850 RBC ANTIBODY SCREEN: CPT

## 2024-06-15 PROCEDURE — C9113 INJ PANTOPRAZOLE SODIUM, VIA: HCPCS | Performed by: INTERNAL MEDICINE

## 2024-06-15 PROCEDURE — 80048 BASIC METABOLIC PNL TOTAL CA: CPT

## 2024-06-15 PROCEDURE — 97530 THERAPEUTIC ACTIVITIES: CPT

## 2024-06-15 PROCEDURE — 86923 COMPATIBILITY TEST ELECTRIC: CPT

## 2024-06-15 PROCEDURE — 6360000002 HC RX W HCPCS: Performed by: INTERNAL MEDICINE

## 2024-06-15 PROCEDURE — 2580000003 HC RX 258: Performed by: INTERNAL MEDICINE

## 2024-06-15 PROCEDURE — 36415 COLL VENOUS BLD VENIPUNCTURE: CPT

## 2024-06-15 PROCEDURE — 85014 HEMATOCRIT: CPT

## 2024-06-15 RX ORDER — OLANZAPINE 10 MG/2ML
INJECTION, POWDER, FOR SOLUTION INTRAMUSCULAR
Status: COMPLETED
Start: 2024-06-15 | End: 2024-06-15

## 2024-06-15 RX ORDER — MIDODRINE HYDROCHLORIDE 5 MG/1
5 TABLET ORAL
Status: DISCONTINUED | OUTPATIENT
Start: 2024-06-15 | End: 2024-06-18

## 2024-06-15 RX ORDER — SODIUM CHLORIDE 9 MG/ML
INJECTION, SOLUTION INTRAVENOUS PRN
Status: DISCONTINUED | OUTPATIENT
Start: 2024-06-15 | End: 2024-06-23 | Stop reason: HOSPADM

## 2024-06-15 RX ORDER — WATER 10 ML/10ML
INJECTION INTRAMUSCULAR; INTRAVENOUS; SUBCUTANEOUS
Status: COMPLETED
Start: 2024-06-15 | End: 2024-06-15

## 2024-06-15 RX ADMIN — BISACODYL 10 MG: 5 TABLET, COATED ORAL at 08:41

## 2024-06-15 RX ADMIN — CARBIDOPA AND LEVODOPA 1 TABLET: 10; 100 TABLET ORAL at 20:31

## 2024-06-15 RX ADMIN — OLANZAPINE 10 MG: 10 INJECTION, POWDER, FOR SOLUTION INTRAMUSCULAR at 02:10

## 2024-06-15 RX ADMIN — MIDODRINE HYDROCHLORIDE 5 MG: 5 TABLET ORAL at 18:13

## 2024-06-15 RX ADMIN — CARBIDOPA AND LEVODOPA 1 TABLET: 10; 100 TABLET ORAL at 18:13

## 2024-06-15 RX ADMIN — WATER 2.5 MG: 1 INJECTION INTRAMUSCULAR; INTRAVENOUS; SUBCUTANEOUS at 02:10

## 2024-06-15 RX ADMIN — POLYETHYLENE GLYCOL 3350, SODIUM SULFATE ANHYDROUS, SODIUM BICARBONATE, SODIUM CHLORIDE, POTASSIUM CHLORIDE 2000 ML: 236; 22.74; 6.74; 5.86; 2.97 POWDER, FOR SOLUTION ORAL at 18:13

## 2024-06-15 RX ADMIN — SODIUM CHLORIDE, PRESERVATIVE FREE 40 MG: 5 INJECTION INTRAVENOUS at 20:31

## 2024-06-15 RX ADMIN — MIDODRINE HYDROCHLORIDE 5 MG: 5 TABLET ORAL at 12:25

## 2024-06-15 RX ADMIN — CHOLECALCIFEROL TAB 125 MCG (5000 UNIT) 5000 UNITS: 125 TAB at 08:42

## 2024-06-15 RX ADMIN — CARBIDOPA AND LEVODOPA 1 TABLET: 10; 100 TABLET ORAL at 08:41

## 2024-06-15 RX ADMIN — ACETAMINOPHEN 650 MG: 325 TABLET ORAL at 00:10

## 2024-06-15 RX ADMIN — WATER: 1 INJECTION INTRAMUSCULAR; INTRAVENOUS; SUBCUTANEOUS at 02:10

## 2024-06-15 RX ADMIN — SODIUM CHLORIDE, PRESERVATIVE FREE 10 ML: 5 INJECTION INTRAVENOUS at 20:31

## 2024-06-15 RX ADMIN — SODIUM CHLORIDE, PRESERVATIVE FREE 40 MG: 5 INJECTION INTRAVENOUS at 08:41

## 2024-06-15 ASSESSMENT — PAIN SCALES - GENERAL: PAINLEVEL_OUTOF10: 5

## 2024-06-15 ASSESSMENT — PAIN DESCRIPTION - LOCATION: LOCATION: THROAT

## 2024-06-15 NOTE — CONSENT
Informed Consent for Blood Component Transfusion Note    I have discussed with the daughter the rationale for blood component transfusion; its benefits in treating or preventing fatigue, organ damage, or death; and its risk which includes mild transfusion reactions, rare risk of blood borne infection, or more serious but rare reactions. I have discussed the alternatives to transfusion, including the risk and consequences of not receiving transfusion. The daughter had an opportunity to ask questions and had agreed to proceed with transfusion of blood components.    Electronically signed by Javed Gold MD on 6/15/24 at 10:37 AM EDT

## 2024-06-15 NOTE — PROGRESS NOTES
[facility]  [address] [place] [address] [phone]      Hospitalist Progress Note      NAME: Sloan Sarkar   :  1933  MRM:  [id number]    Date of service: 6/15/2024  10:39 AM       Assessment and Plan:   GI bleed/melena.  Had EGD on  and is unremarkable. Plan for colonoscopy on .  Continue Protonix 40 mg IV twice daily.  GI following.       2.  Acute blood loss anemia due to GI bleed. Transfuse one unit of PRBC 6/15. Monitor H/H closley and transfuse as needed.     3.  Confusion likely hospital delirium. Supportive care     4.  Hypotension. Continue NS maintenance. Start midodrine.     5.  Parkinson's disease.  Continue home medication of carbidopa-levodopa  mg p.o. 3 times daily.         Subjective:     Chief Complaint:: Patient was seen and examined as a follow up for GI bleed.  Chart was reviewed. confused     ROS:  (bold if positive, if negative)    Tolerating PT  Tolerating Diet        Objective:     Last 24hrs VS reviewed since prior progress note. Most recent are:    Vitals:    06/15/24 0804   BP: (!) 91/52   Pulse: 62   Resp:    Temp: 97.7 °F (36.5 °C)   SpO2: 99%     SpO2 Readings from Last 6 Encounters:   06/15/24 99%          Intake/Output Summary (Last 24 hours) at 6/15/2024 1039  Last data filed at 6/15/2024 0329  Gross per 24 hour   Intake 800 ml   Output 500 ml   Net 300 ml          Physical Exam:    Gen:  Well-developed, well-nourished, in no acute distress  HEENT:  Pink conjunctivae, PERRL, hearing intact to voice, moist mucous membranes  Neck:  Supple, without masses, thyroid non-tender  Resp:  No accessory muscle use, clear breath sounds without wheezes rales or rhonchi  Card:  No murmurs, normal S1, S2 without thrills, bruits or peripheral edema  Abd:  Soft, non-tender, non-distended, normoactive bowel sounds are present, no palpable organomegaly and no detectable hernias  Lymph:  No cervical or inguinal adenopathy  Musc:  No

## 2024-06-16 LAB
ANION GAP SERPL CALC-SCNC: 5 MMOL/L (ref 5–15)
BASOPHILS # BLD: 0 K/UL (ref 0–0.1)
BASOPHILS NFR BLD: 1 % (ref 0–1)
BUN SERPL-MCNC: 19 MG/DL (ref 6–20)
BUN/CREAT SERPL: 17 (ref 12–20)
CALCIUM SERPL-MCNC: 8.3 MG/DL (ref 8.5–10.1)
CHLORIDE SERPL-SCNC: 118 MMOL/L (ref 97–108)
CO2 SERPL-SCNC: 23 MMOL/L (ref 21–32)
CREAT SERPL-MCNC: 1.1 MG/DL (ref 0.7–1.3)
DIFFERENTIAL METHOD BLD: ABNORMAL
EOSINOPHIL # BLD: 0.1 K/UL (ref 0–0.4)
EOSINOPHIL NFR BLD: 3 % (ref 0–7)
ERYTHROCYTE [DISTWIDTH] IN BLOOD BY AUTOMATED COUNT: 14.7 % (ref 11.5–14.5)
GLUCOSE SERPL-MCNC: 88 MG/DL (ref 65–100)
HCT VFR BLD AUTO: 24.7 % (ref 36.6–50.3)
HGB BLD-MCNC: 8.1 G/DL (ref 12.1–17)
IMM GRANULOCYTES # BLD AUTO: 0 K/UL (ref 0–0.04)
IMM GRANULOCYTES NFR BLD AUTO: 1 % (ref 0–0.5)
LYMPHOCYTES # BLD: 1.1 K/UL (ref 0.8–3.5)
LYMPHOCYTES NFR BLD: 26 % (ref 12–49)
MCH RBC QN AUTO: 30.2 PG (ref 26–34)
MCHC RBC AUTO-ENTMCNC: 32.8 G/DL (ref 30–36.5)
MCV RBC AUTO: 92.2 FL (ref 80–99)
MONOCYTES # BLD: 0.6 K/UL (ref 0–1)
MONOCYTES NFR BLD: 13 % (ref 5–13)
NEUTS SEG # BLD: 2.5 K/UL (ref 1.8–8)
NEUTS SEG NFR BLD: 56 % (ref 32–75)
NRBC # BLD: 0 K/UL (ref 0–0.01)
NRBC BLD-RTO: 0 PER 100 WBC
PLATELET # BLD AUTO: 126 K/UL (ref 150–400)
PMV BLD AUTO: 11.1 FL (ref 8.9–12.9)
POTASSIUM SERPL-SCNC: 3.2 MMOL/L (ref 3.5–5.1)
RBC # BLD AUTO: 2.68 M/UL (ref 4.1–5.7)
SODIUM SERPL-SCNC: 146 MMOL/L (ref 136–145)
WBC # BLD AUTO: 4.4 K/UL (ref 4.1–11.1)

## 2024-06-16 PROCEDURE — 6370000000 HC RX 637 (ALT 250 FOR IP): Performed by: INTERNAL MEDICINE

## 2024-06-16 PROCEDURE — 6360000002 HC RX W HCPCS: Performed by: INTERNAL MEDICINE

## 2024-06-16 PROCEDURE — 85025 COMPLETE CBC W/AUTO DIFF WBC: CPT

## 2024-06-16 PROCEDURE — 1100000000 HC RM PRIVATE

## 2024-06-16 PROCEDURE — 80048 BASIC METABOLIC PNL TOTAL CA: CPT

## 2024-06-16 PROCEDURE — 94761 N-INVAS EAR/PLS OXIMETRY MLT: CPT

## 2024-06-16 PROCEDURE — C9113 INJ PANTOPRAZOLE SODIUM, VIA: HCPCS | Performed by: INTERNAL MEDICINE

## 2024-06-16 PROCEDURE — 2580000003 HC RX 258: Performed by: INTERNAL MEDICINE

## 2024-06-16 PROCEDURE — 36430 TRANSFUSION BLD/BLD COMPNT: CPT

## 2024-06-16 PROCEDURE — 36415 COLL VENOUS BLD VENIPUNCTURE: CPT

## 2024-06-16 PROCEDURE — P9016 RBC LEUKOCYTES REDUCED: HCPCS

## 2024-06-16 PROCEDURE — 30233N1 TRANSFUSION OF NONAUTOLOGOUS RED BLOOD CELLS INTO PERIPHERAL VEIN, PERCUTANEOUS APPROACH: ICD-10-PCS | Performed by: INTERNAL MEDICINE

## 2024-06-16 RX ADMIN — SODIUM CHLORIDE, PRESERVATIVE FREE 10 ML: 5 INJECTION INTRAVENOUS at 21:46

## 2024-06-16 RX ADMIN — CHOLECALCIFEROL TAB 125 MCG (5000 UNIT) 5000 UNITS: 125 TAB at 09:00

## 2024-06-16 RX ADMIN — MIDODRINE HYDROCHLORIDE 5 MG: 5 TABLET ORAL at 16:54

## 2024-06-16 RX ADMIN — CARBIDOPA AND LEVODOPA 1 TABLET: 10; 100 TABLET ORAL at 21:45

## 2024-06-16 RX ADMIN — SODIUM CHLORIDE, PRESERVATIVE FREE 10 ML: 5 INJECTION INTRAVENOUS at 09:01

## 2024-06-16 RX ADMIN — SODIUM CHLORIDE, PRESERVATIVE FREE 40 MG: 5 INJECTION INTRAVENOUS at 21:45

## 2024-06-16 RX ADMIN — POLYETHYLENE GLYCOL 3350, SODIUM SULFATE ANHYDROUS, SODIUM BICARBONATE, SODIUM CHLORIDE, POTASSIUM CHLORIDE 2000 ML: 236; 22.74; 6.74; 5.86; 2.97 POWDER, FOR SOLUTION ORAL at 16:12

## 2024-06-16 RX ADMIN — POTASSIUM BICARBONATE 40 MEQ: 782 TABLET, EFFERVESCENT ORAL at 09:00

## 2024-06-16 RX ADMIN — CARBIDOPA AND LEVODOPA 1 TABLET: 10; 100 TABLET ORAL at 09:00

## 2024-06-16 RX ADMIN — SODIUM CHLORIDE, PRESERVATIVE FREE 40 MG: 5 INJECTION INTRAVENOUS at 09:01

## 2024-06-16 RX ADMIN — MIDODRINE HYDROCHLORIDE 5 MG: 5 TABLET ORAL at 09:00

## 2024-06-16 RX ADMIN — BISACODYL 10 MG: 5 TABLET, COATED ORAL at 09:00

## 2024-06-16 RX ADMIN — CARBIDOPA AND LEVODOPA 1 TABLET: 10; 100 TABLET ORAL at 14:07

## 2024-06-16 ASSESSMENT — PAIN SCALES - GENERAL
PAINLEVEL_OUTOF10: 0
PAINLEVEL_OUTOF10: 0

## 2024-06-16 NOTE — PROGRESS NOTES
[facility]  [address] [place] [address] [phone]      Hospitalist Progress Note      NAME: Sloan Sarkar   :  1933  MRM:  [id number]    Date of service: 2024  8:55 AM       Assessment and Plan:   GI bleed/melena.  Had EGD on  and is unremarkable. Plan for colonoscopy on .  Continue Protonix 40 mg IV twice daily.  GI following.       2.  Acute blood loss anemia due to GI bleed. Transfuse one unit of PRBC 6/15. Monitor H/H closley and transfuse as needed.     3.  Confusion likely hospital delirium. Supportive care. Better this morning      4.  Hypotension. Continue NS maintenance. Started midodrine.     5.  Parkinson's disease.  Continue home medication of carbidopa-levodopa  mg p.o. 3 times daily.         Subjective:     Chief Complaint:: Patient was seen and examined as a follow up for GI bleed.  Chart was reviewed. confused     ROS:  (bold if positive, if negative)    Tolerating PT  Tolerating Diet        Objective:     Last 24hrs VS reviewed since prior progress note. Most recent are:    Vitals:    24 0729   BP: (!) 133/95   Pulse: 65   Resp: 17   Temp: 97.7 °F (36.5 °C)   SpO2: 93%     SpO2 Readings from Last 6 Encounters:   24 93%          Intake/Output Summary (Last 24 hours) at 2024 0855  Last data filed at 2024 0616  Gross per 24 hour   Intake 698.25 ml   Output 1200 ml   Net -501.75 ml          Physical Exam:    Gen:  Well-developed, well-nourished, in no acute distress  HEENT:  Pink conjunctivae, PERRL, hearing intact to voice, moist mucous membranes  Neck:  Supple, without masses, thyroid non-tender  Resp:  No accessory muscle use, clear breath sounds without wheezes rales or rhonchi  Card:  No murmurs, normal S1, S2 without thrills, bruits or peripheral edema  Abd:  Soft, non-tender, non-distended, normoactive bowel sounds are present, no palpable organomegaly and no detectable hernias  Lymph:  No cervical

## 2024-06-17 ENCOUNTER — ANESTHESIA EVENT (OUTPATIENT)
Facility: HOSPITAL | Age: 89
End: 2024-06-17
Payer: MEDICARE

## 2024-06-17 ENCOUNTER — APPOINTMENT (OUTPATIENT)
Facility: HOSPITAL | Age: 89
DRG: 377 | End: 2024-06-17
Payer: MEDICARE

## 2024-06-17 ENCOUNTER — ANESTHESIA (OUTPATIENT)
Facility: HOSPITAL | Age: 89
End: 2024-06-17
Payer: MEDICARE

## 2024-06-17 LAB
ANION GAP SERPL CALC-SCNC: 6 MMOL/L (ref 5–15)
BASOPHILS # BLD: 0 K/UL (ref 0–0.1)
BASOPHILS NFR BLD: 1 % (ref 0–1)
BUN SERPL-MCNC: 15 MG/DL (ref 6–20)
BUN/CREAT SERPL: 14 (ref 12–20)
CALCIUM SERPL-MCNC: 8.4 MG/DL (ref 8.5–10.1)
CHLORIDE SERPL-SCNC: 115 MMOL/L (ref 97–108)
CO2 SERPL-SCNC: 25 MMOL/L (ref 21–32)
COMMENT:: NORMAL
CREAT SERPL-MCNC: 1.05 MG/DL (ref 0.7–1.3)
DIFFERENTIAL METHOD BLD: ABNORMAL
EOSINOPHIL # BLD: 0.2 K/UL (ref 0–0.4)
EOSINOPHIL NFR BLD: 3 % (ref 0–7)
ERYTHROCYTE [DISTWIDTH] IN BLOOD BY AUTOMATED COUNT: 15.1 % (ref 11.5–14.5)
GLUCOSE SERPL-MCNC: 84 MG/DL (ref 65–100)
HCT VFR BLD AUTO: 20.9 % (ref 36.6–50.3)
HCT VFR BLD AUTO: 28.8 % (ref 36.6–50.3)
HGB BLD-MCNC: 6.9 G/DL (ref 12.1–17)
HGB BLD-MCNC: 9.5 G/DL (ref 12.1–17)
HISTORY CHECK: NORMAL
IMM GRANULOCYTES # BLD AUTO: 0 K/UL (ref 0–0.04)
IMM GRANULOCYTES NFR BLD AUTO: 0 % (ref 0–0.5)
LYMPHOCYTES # BLD: 1 K/UL (ref 0.8–3.5)
LYMPHOCYTES NFR BLD: 23 % (ref 12–49)
MCH RBC QN AUTO: 30 PG (ref 26–34)
MCHC RBC AUTO-ENTMCNC: 33 G/DL (ref 30–36.5)
MCV RBC AUTO: 90.9 FL (ref 80–99)
MONOCYTES # BLD: 0.5 K/UL (ref 0–1)
MONOCYTES NFR BLD: 11 % (ref 5–13)
NEUTS SEG # BLD: 2.8 K/UL (ref 1.8–8)
NEUTS SEG NFR BLD: 62 % (ref 32–75)
NRBC # BLD: 0 K/UL (ref 0–0.01)
NRBC BLD-RTO: 0 PER 100 WBC
PLATELET # BLD AUTO: 122 K/UL (ref 150–400)
PMV BLD AUTO: 10.8 FL (ref 8.9–12.9)
POTASSIUM SERPL-SCNC: 2.9 MMOL/L (ref 3.5–5.1)
RBC # BLD AUTO: 2.3 M/UL (ref 4.1–5.7)
SODIUM SERPL-SCNC: 146 MMOL/L (ref 136–145)
SPECIMEN HOLD: NORMAL
WBC # BLD AUTO: 4.5 K/UL (ref 4.1–11.1)

## 2024-06-17 PROCEDURE — 94761 N-INVAS EAR/PLS OXIMETRY MLT: CPT

## 2024-06-17 PROCEDURE — 0DJD8ZZ INSPECTION OF LOWER INTESTINAL TRACT, VIA NATURAL OR ARTIFICIAL OPENING ENDOSCOPIC: ICD-10-PCS | Performed by: INTERNAL MEDICINE

## 2024-06-17 PROCEDURE — 85014 HEMATOCRIT: CPT

## 2024-06-17 PROCEDURE — 85025 COMPLETE CBC W/AUTO DIFF WBC: CPT

## 2024-06-17 PROCEDURE — 74174 CTA ABD&PLVS W/CONTRAST: CPT

## 2024-06-17 PROCEDURE — 85018 HEMOGLOBIN: CPT

## 2024-06-17 PROCEDURE — C9113 INJ PANTOPRAZOLE SODIUM, VIA: HCPCS | Performed by: INTERNAL MEDICINE

## 2024-06-17 PROCEDURE — 36415 COLL VENOUS BLD VENIPUNCTURE: CPT

## 2024-06-17 PROCEDURE — 51798 US URINE CAPACITY MEASURE: CPT

## 2024-06-17 PROCEDURE — 6370000000 HC RX 637 (ALT 250 FOR IP): Performed by: FAMILY MEDICINE

## 2024-06-17 PROCEDURE — 6360000002 HC RX W HCPCS: Performed by: FAMILY MEDICINE

## 2024-06-17 PROCEDURE — 80048 BASIC METABOLIC PNL TOTAL CA: CPT

## 2024-06-17 PROCEDURE — 6360000004 HC RX CONTRAST MEDICATION: Performed by: INTERNAL MEDICINE

## 2024-06-17 PROCEDURE — 6370000000 HC RX 637 (ALT 250 FOR IP): Performed by: INTERNAL MEDICINE

## 2024-06-17 PROCEDURE — 3600007502: Performed by: INTERNAL MEDICINE

## 2024-06-17 PROCEDURE — 36430 TRANSFUSION BLD/BLD COMPNT: CPT

## 2024-06-17 PROCEDURE — 6360000002 HC RX W HCPCS: Performed by: NURSE ANESTHETIST, CERTIFIED REGISTERED

## 2024-06-17 PROCEDURE — 7100000010 HC PHASE II RECOVERY - FIRST 15 MIN: Performed by: INTERNAL MEDICINE

## 2024-06-17 PROCEDURE — 3700000000 HC ANESTHESIA ATTENDED CARE: Performed by: INTERNAL MEDICINE

## 2024-06-17 PROCEDURE — 2580000003 HC RX 258: Performed by: INTERNAL MEDICINE

## 2024-06-17 PROCEDURE — 6360000002 HC RX W HCPCS: Performed by: INTERNAL MEDICINE

## 2024-06-17 PROCEDURE — 3600007512: Performed by: INTERNAL MEDICINE

## 2024-06-17 PROCEDURE — 7100000011 HC PHASE II RECOVERY - ADDTL 15 MIN: Performed by: INTERNAL MEDICINE

## 2024-06-17 PROCEDURE — 2580000003 HC RX 258: Performed by: NURSE ANESTHETIST, CERTIFIED REGISTERED

## 2024-06-17 PROCEDURE — 1100000000 HC RM PRIVATE

## 2024-06-17 PROCEDURE — P9016 RBC LEUKOCYTES REDUCED: HCPCS

## 2024-06-17 PROCEDURE — 3700000001 HC ADD 15 MINUTES (ANESTHESIA): Performed by: INTERNAL MEDICINE

## 2024-06-17 RX ORDER — POTASSIUM CHLORIDE 750 MG/1
40 TABLET, FILM COATED, EXTENDED RELEASE ORAL ONCE
Status: COMPLETED | OUTPATIENT
Start: 2024-06-17 | End: 2024-06-17

## 2024-06-17 RX ORDER — SODIUM CHLORIDE 9 MG/ML
25 INJECTION, SOLUTION INTRAVENOUS PRN
Status: DISCONTINUED | OUTPATIENT
Start: 2024-06-17 | End: 2024-06-17 | Stop reason: HOSPADM

## 2024-06-17 RX ORDER — POTASSIUM CHLORIDE 7.45 MG/ML
10 INJECTION INTRAVENOUS
Status: ACTIVE | OUTPATIENT
Start: 2024-06-17 | End: 2024-06-17

## 2024-06-17 RX ORDER — POTASSIUM CHLORIDE 7.45 MG/ML
10 INJECTION INTRAVENOUS
Status: DISPENSED | OUTPATIENT
Start: 2024-06-17 | End: 2024-06-17

## 2024-06-17 RX ORDER — SODIUM CHLORIDE 9 MG/ML
INJECTION, SOLUTION INTRAVENOUS CONTINUOUS PRN
Status: DISCONTINUED | OUTPATIENT
Start: 2024-06-17 | End: 2024-06-17 | Stop reason: SDUPTHER

## 2024-06-17 RX ORDER — PROPOFOL 10 MG/ML
INJECTION, EMULSION INTRAVENOUS PRN
Status: DISCONTINUED | OUTPATIENT
Start: 2024-06-17 | End: 2024-06-17 | Stop reason: SDUPTHER

## 2024-06-17 RX ORDER — SODIUM CHLORIDE 9 MG/ML
INJECTION, SOLUTION INTRAVENOUS PRN
Status: DISCONTINUED | OUTPATIENT
Start: 2024-06-17 | End: 2024-06-23 | Stop reason: HOSPADM

## 2024-06-17 RX ORDER — SODIUM CHLORIDE 0.9 % (FLUSH) 0.9 %
5-40 SYRINGE (ML) INJECTION EVERY 12 HOURS SCHEDULED
Status: DISCONTINUED | OUTPATIENT
Start: 2024-06-17 | End: 2024-06-17 | Stop reason: HOSPADM

## 2024-06-17 RX ORDER — POTASSIUM CHLORIDE 7.45 MG/ML
10 INJECTION INTRAVENOUS ONCE
Status: COMPLETED | OUTPATIENT
Start: 2024-06-17 | End: 2024-06-17

## 2024-06-17 RX ORDER — SODIUM CHLORIDE 0.9 % (FLUSH) 0.9 %
5-40 SYRINGE (ML) INJECTION PRN
Status: DISCONTINUED | OUTPATIENT
Start: 2024-06-17 | End: 2024-06-17 | Stop reason: HOSPADM

## 2024-06-17 RX ADMIN — MIDODRINE HYDROCHLORIDE 5 MG: 5 TABLET ORAL at 17:24

## 2024-06-17 RX ADMIN — SODIUM CHLORIDE, PRESERVATIVE FREE 5 ML: 5 INJECTION INTRAVENOUS at 13:20

## 2024-06-17 RX ADMIN — POTASSIUM CHLORIDE 10 MEQ: 7.46 INJECTION, SOLUTION INTRAVENOUS at 14:19

## 2024-06-17 RX ADMIN — MIDODRINE HYDROCHLORIDE 5 MG: 5 TABLET ORAL at 09:08

## 2024-06-17 RX ADMIN — POTASSIUM CHLORIDE 10 MEQ: 7.46 INJECTION, SOLUTION INTRAVENOUS at 13:18

## 2024-06-17 RX ADMIN — CARBIDOPA AND LEVODOPA 1 TABLET: 10; 100 TABLET ORAL at 13:18

## 2024-06-17 RX ADMIN — MIDODRINE HYDROCHLORIDE 5 MG: 5 TABLET ORAL at 13:19

## 2024-06-17 RX ADMIN — PROPOFOL 30 MG: 10 INJECTION, EMULSION INTRAVENOUS at 10:49

## 2024-06-17 RX ADMIN — POTASSIUM CHLORIDE 10 MEQ: 7.46 INJECTION, SOLUTION INTRAVENOUS at 16:30

## 2024-06-17 RX ADMIN — PROPOFOL 70 MCG/KG/MIN: 10 INJECTION, EMULSION INTRAVENOUS at 10:50

## 2024-06-17 RX ADMIN — IOPAMIDOL 100 ML: 755 INJECTION, SOLUTION INTRAVENOUS at 15:31

## 2024-06-17 RX ADMIN — CARBIDOPA AND LEVODOPA 1 TABLET: 10; 100 TABLET ORAL at 22:14

## 2024-06-17 RX ADMIN — POTASSIUM CHLORIDE 40 MEQ: 750 TABLET, FILM COATED, EXTENDED RELEASE ORAL at 09:08

## 2024-06-17 RX ADMIN — SODIUM CHLORIDE: 9 INJECTION, SOLUTION INTRAVENOUS at 09:20

## 2024-06-17 RX ADMIN — SODIUM CHLORIDE, PRESERVATIVE FREE 5 ML: 5 INJECTION INTRAVENOUS at 09:09

## 2024-06-17 RX ADMIN — CHOLECALCIFEROL TAB 125 MCG (5000 UNIT) 5000 UNITS: 125 TAB at 13:19

## 2024-06-17 RX ADMIN — SODIUM CHLORIDE: 9 INJECTION, SOLUTION INTRAVENOUS at 10:45

## 2024-06-17 RX ADMIN — SODIUM CHLORIDE, PRESERVATIVE FREE 40 MG: 5 INJECTION INTRAVENOUS at 22:14

## 2024-06-17 RX ADMIN — POTASSIUM CHLORIDE 10 MEQ: 7.46 INJECTION, SOLUTION INTRAVENOUS at 09:20

## 2024-06-17 RX ADMIN — SODIUM CHLORIDE, PRESERVATIVE FREE 10 ML: 5 INJECTION INTRAVENOUS at 22:15

## 2024-06-17 RX ADMIN — SODIUM CHLORIDE, PRESERVATIVE FREE 40 MG: 5 INJECTION INTRAVENOUS at 13:20

## 2024-06-17 ASSESSMENT — PAIN - FUNCTIONAL ASSESSMENT: PAIN_FUNCTIONAL_ASSESSMENT: 0-10

## 2024-06-17 ASSESSMENT — PAIN SCALES - GENERAL: PAINLEVEL_OUTOF10: 0

## 2024-06-17 NOTE — CARE COORDINATION
3:32 PM  06/17/24    Care Management Progress Note    Reason for Admission:   GI (gastrointestinal hemorrhage) [K92.2]  Symptomatic anemia [D64.9]  Gastrointestinal hemorrhage, unspecified gastrointestinal hemorrhage type [K92.2]  Procedure(s) (LRB):  COLONOSCOPY DIAGNOSTIC (N/A)  Day of Surgery    Patient Admission Status: Inpatient  RUR: Readmission Risk Score: 14.5    Hospitalization in the last 30 days (Readmission):  No        Transition of care plan:  Ongoing medical management- pt discussed during IDR; pt to have blood transfusion and colonoscopy today.  Anticipated discharge is home with daughter- PT rec for home health; pt had [facility] in the past. CM spoke with pt's daughter Eva and she requested referral be sent to [facility] again for continuity. CM [facility]; awaiting response.  Outpatient follow-up.  Pt's family to transport.    KAILEY Mattson

## 2024-06-17 NOTE — PROGRESS NOTES
Patient transported back to room with telebox #[id number] attached and on. Nurse accompanied transport tech.

## 2024-06-17 NOTE — PERIOP NOTE

## 2024-06-17 NOTE — ANESTHESIA PRE PROCEDURE
06/17/2024 05:18 AM    BILITOT 0.6 06/13/2024 05:06 PM    ALKPHOS 61 06/13/2024 05:06 PM    ALKPHOS 81 01/01/2022 03:44 PM    AST 16 06/13/2024 05:06 PM    ALT 13 06/13/2024 05:06 PM       POC Tests: No results for input(s): \"POCGLU\", \"POCNA\", \"POCK\", \"POCCL\", \"POCBUN\", \"POCHEMO\", \"POCHCT\" in the last 72 hours.    Coags:   Lab Results   Component Value Date/Time    PROTIME 15.3 12/29/2021 11:45 AM    INR 1.5 12/29/2021 11:45 AM       HCG (If Applicable): No results found for: \"PREGTESTUR\", \"PREGSERUM\", \"HCG\", \"HCGQUANT\"     ABGs: No results found for: \"PHART\", \"PO2ART\", \"EBK0YWC\", \"QWT6LOT\", \"BEART\", \"Z7SAWERK\"     Type & Screen (If Applicable):  No results found for: \"LABABO\"    Drug/Infectious Status (If Applicable):  Lab Results   Component Value Date/Time    HEPCAB NONREACTIVE 12/29/2021 11:45 AM       COVID-19 Screening (If Applicable):   Lab Results   Component Value Date/Time    COVID19 Not detected 12/29/2021 03:34 AM    COVID19 Not detected 12/28/2021 11:13 PM           Anesthesia Evaluation  Patient summary reviewed and Nursing notes reviewed  Airway: Mallampati: II  TM distance: >3 FB   Neck ROM: full  Mouth opening: > = 3 FB   Dental:    (+) poor dentition      Pulmonary: breath sounds clear to auscultation  (+)     sleep apnea:                                  Cardiovascular:  Exercise tolerance: poor (<4 METS)  (+) hypertension:        Rhythm: regular  Rate: normal                    Neuro/Psych:   (+) neuromuscular disease: Parkinson's disease, psychiatric history:dementia            GI/Hepatic/Renal:   (+) liver disease:          Endo/Other:    (+) : arthritis:..                 Abdominal:             Vascular: negative vascular ROS.         Other Findings:         Anesthesia Plan      MAC     ASA 1       Induction: intravenous.      Anesthetic plan and risks discussed with child/children and legal guardian (Phone consent obtained).    Use of blood products discussed with legal guardian and

## 2024-06-17 NOTE — PROGRESS NOTES
TRANSFER - OUT REPORT:    Verbal report given to Maris VILLANUEVA on Sloan Sarkar  being transferred to [facility] for routine progression of patient care       Report consisted of patient's Situation, Background, Assessment and   Recommendations(SBAR).     Information from the following report(s) Nurse Handoff Report and Recent Results was reviewed with the receiving nurse.           Lines:   Peripheral IV 06/16/24 Left Antecubital (Active)   Site Assessment Clean, dry & intact 06/17/24 1003   Line Status Infusing 06/17/24 1003   Line Care Connections checked and tightened;Cap changed 06/17/24 1003   Phlebitis Assessment No symptoms 06/17/24 1003   Infiltration Assessment 0 06/17/24 1003   Alcohol Cap Used Yes 06/17/24 1003   Dressing Status Clean, dry & intact 06/17/24 1003   Dressing Type Transparent 06/17/24 1003       Peripheral IV 06/17/24 Posterior;Right Forearm (Active)   Site Assessment Clean, dry & intact 06/17/24 1026   Line Status Infusing 06/17/24 1026   Line Care Connections checked and tightened 06/17/24 1026   Phlebitis Assessment No symptoms 06/17/24 1026   Infiltration Assessment 0 06/17/24 1026   Alcohol Cap Used Yes 06/17/24 1026   Dressing Status Clean, dry & intact 06/17/24 1026   Dressing Type Transparent 06/17/24 1026       Peripheral IV 06/17/24 Distal;Right;Anterior Cephalic (Active)        Opportunity for questions and clarification was provided.      Patient transported with:  Monitor/tele box, IV pump with fluids, chart

## 2024-06-17 NOTE — PERIOP NOTE
TRANSFER - IN REPORT:    Verbal report received from Maris VILLANUEVA on Sloan Sarkar  being received from 420 for ordered procedure      Report consisted of patient's Situation, Background, Assessment and   Recommendations(SBAR).     Information from the following report(s) Med Rec Status, Alarm Parameters, Pre Procedure Checklist, and Procedure Verification was reviewed with the receiving nurse.    Opportunity for questions and clarification was provided.      Assessment completed upon patient's arrival to unit and care assumed.

## 2024-06-17 NOTE — H&P
Facility-Administered Medications: None       PHYSICAL EXAM:  The patient is examined immediately prior to the procedure.  Vitals:    06/17/24 0955   BP: 134/71   Pulse: 63   Resp: 17   Temp: (!) 32 °F (0 °C)   SpO2: 93%     Gen: Appears comfortable, no distress.  Pulm: comfortable respirations with no abnormal audible breath sounds  HEART: well perfused, no abnormal audible heart sounds  GI: abdomen flat.    PLAN:  Informed consent discussion held, patient afforded an opportunity to ask questions and all questions answered.  After being advised of the risks, benefits, and alternatives, the patient requested that we proceed and indicated so on a written consent form.      Will proceed with procedure as planned.  Felix Story MD   
mm,  unchanged. Abundant coronary atherosclerotic calcifications again noted.  LIVER: No mass.  BILIARY TREE: Gallbladder is within normal limits. CBD is not dilated.  SPLEEN: within normal limits.  PANCREAS: No mass or ductal dilatation.  ADRENALS: Unremarkable.  KIDNEYS: No mass, calculus, or hydronephrosis.  STOMACH: Unremarkable.  SMALL BOWEL: No dilatation or wall thickening.  COLON: Nonspecific mild mucosal thickening in the splenic flexure and mid  sigmoid region. No colonic dilation.  APPENDIX: Not shown.  PERITONEUM: No ascites or pneumoperitoneum.  RETROPERITONEUM: Mild aortic tortuosity and ectasia. No aneurysm. No  retroperitoneal mass or adenopathy.  REPRODUCTIVE ORGANS: Brachytherapy artifacts within the prostate.  URINARY BLADDER: No mass or calculus.  BONES: No destructive bone lesion.  ABDOMINAL WALL: Direct inguinal hernias again shown, larger on the left,  containing fat.  ADDITIONAL COMMENTS: N/A  Impression: Nonspecific mild mucosal thickening at the splenic flexure and mid sigmoid  colon.  See report for other findings.    Electronically signed by Ming Brumfield MD  XR CHEST PORTABLE  Narrative: EXAM:  XR CHEST PORTABLE    INDICATION: fatigue     COMPARISON: December 2021    TECHNIQUE: portable chest AP view    FINDINGS: The cardiac silhouette is within normal limits. The pulmonary  vasculature is within normal limits.     Lung volumes are moderate, with bibasilar atelectasis and possible left basilar  infiltrate. The thoracic aorta remains prominent but stable. The visualized  bones and upper abdomen are age-appropriate.  Impression: Bibasilar atelectasis, possible left basilar infiltrate.    Electronically signed by RICKY HERNANDEZ        The purpose of this note is to communicate optimally with other physicians, advanced care practitioners and appropriate medical staff involved in the care of this patient and facilitate management.  It is written using standard medical

## 2024-06-17 NOTE — PROGRESS NOTES
Physical therapy    Chart reviewed, pt HGB 6.9 to received PRBC's and schedule for colonoscopy today. PT will attempt later today, time permitting.

## 2024-06-17 NOTE — OP NOTE
.                         [facility]  [facility]  Felix Story MD  [phone]      2024    Esophagogastroduodenoscopy (EGD) Procedure Note  Sloan Sarkar  : 1933  Critical access hospital Medical Record Number: [id number]      Indications:   melena, blood loss anemia   Referring Physician:  Kulwinder Ross MD  Anesthesia/Sedation: Monitored anesthesia care, see separate note  Endoscopist:  Felix Story MD   Complications:  None  Estimated Blood Loss:  None    Permit:  The indications, risks, benefits and alternatives were reviewed with the patient or their decision maker who was provided an opportunity to ask questions and all questions were answered.  The specific risks of esophagogastroduodenoscopy with conscious sedation were reviewed, including but not limited to anesthetic complication, bleeding, adverse drug reaction, missed lesion, infection, IV site reactions, and intestinal perforation which would lead to the need for surgical repair.  Alternatives to EGD including radiographic imaging, observation without testing, or laboratory testing were reviewed as well as the limitations of those alternatives discussed.  After considering the options and having all their questions answered, the patient or their decision maker provided both verbal and written consent to proceed.       Procedure in Detail:  After obtaining informed consent, positioning of the patient in the left lateral decubitus position, and conduction of a pre-procedure pause or \"time out\" the endoscope was introduced into the mouth and advanced to the duodenum.  A careful inspection was made, and findings or interventions are described below.    Findings:   Esophagus-mildly tortuous normal caliber mid and distal esophagus, normal-appearing Z-line, no active or recent bleeding lesions  Stomach-large hiatal hernia with healthy-appearing intra herniated mucosa, otherwise normal-appearing 
hemorrhoids, small nonbleeding external hemorrhoids, normal perineal skin inspection, no palpable anal canal lesions    Cecum-healthy-appearing appendiceal orifice, ileocecal valve, cecal base  Ascending colon, transverse colon, descending colon-healthy-appearing mucosa  Sigmoid colon-extensive diverticular nonobstructive narrowing, muscular hypertrophy and small and large diverticula  Anorectum-healthy-appearing mucosa    Specimens:   None    Complications:   None; patient tolerated the procedure well.    Impression:  Full colonoscopy to cecum with good bowel prep and visualization  Severe sigmoid colon diverticulosis with nonobstructive narrowing  No evidence of active or recent bleeding    Recommendations:   After discussion with the patient's daughter Eva who is also her power of /healthcare proxy, we will proceed with CT enterography to exclude high risk polypoid small bowel bleeding lesions  We discussed that video capsule endoscopy would likely be high risk with potential capsule retention in the sigmoid colon secondary to severe diverticular disease  Differential diagnosis includes small bowel bleeding lesion versus colonic diverticular associated bleeding  Recommend avoiding NSAIDs, antiplatelet or anticoagulant medications  Plan for CT enterography, full liquid diet for now, transfuse to hemoglobin over 7.5    We will contact you by patient later in 7-14 business days regarding pathology results.  Please contact our [facility] office with additional questions or concerns at [phone].    Thank you for entrusting me with this patient's care.  Please do not hesitate to contact me with any questions or if I can be of assistance with any of your other patients' GI needs.    Signed By: Felix Story MD                        June 17, 2024      Surgical assistant none.  Implants none unless specified.

## 2024-06-17 NOTE — PROGRESS NOTES
0755:made dr soto aware potassium 2.9, hgb 6.9 , and asked md if will  order blood transfusion and remote tele. MD placed these orders    1125: received report from Chacha VILLANUEVA and blood transfusion stopped by Chacha VILLANUEVA in Endo at 1105.     1808: Made Dr. Soto aware patient has finished IV bags of potassium now and  Per dr Soto may recheck potassium level with am labs.     1814: made dr soto aware at 1717 voided 350 ml in urinal with 325 ml retained per bladder scan said does not feel like urinating again, feels fine\" no new orders

## 2024-06-17 NOTE — ANESTHESIA POSTPROCEDURE EVALUATION
Department of Anesthesiology  Postprocedure Note    Patient: Sloan Sarkar  MRN: [id number]  YOB: 1933  Date of evaluation: 6/17/2024    Procedure Summary       Date: 06/14/24 Room / Location: [place] [address] / [facility] ENDOSCOPY    Anesthesia Start: 1630 Anesthesia Stop: 1653    Procedure: ESOPHAGOGASTRODUODENOSCOPY (Upper GI Region) Diagnosis:       Melena      (Melena [K92.1])    Surgeons: Felix Story MD Responsible Provider: Darren Son MD    Anesthesia Type: MAC ASA Status: 3            Anesthesia Type: MAC    Kj Phase I: Kj Score: 9    Kj Phase II: Kj Score: 10    Anesthesia Post Evaluation    Patient location during evaluation: PACU  Patient participation: complete - patient participated  Level of consciousness: awake  Pain score: 0  Airway patency: patent  Nausea & Vomiting: no nausea and no vomiting  Cardiovascular status: blood pressure returned to baseline  Respiratory status: acceptable  Hydration status: euvolemic  Pain management: adequate    No notable events documented.

## 2024-06-17 NOTE — ANESTHESIA POSTPROCEDURE EVALUATION
Department of Anesthesiology  Postprocedure Note    Patient: Sloan Sarkar  MRN: [id number]  YOB: 1933  Date of evaluation: 6/17/2024    Procedure Summary       Date: 06/17/24 Room / Location: [place] [address] / [facility] ENDOSCOPY    Anesthesia Start: 1045 Anesthesia Stop: 1116    Procedure: COLONOSCOPY DIAGNOSTIC (Lower GI Region) Diagnosis:       Gastrointestinal hemorrhage, unspecified gastrointestinal hemorrhage type      (Gastrointestinal hemorrhage, unspecified gastrointestinal hemorrhage type [K92.2])    Surgeons: Felix Story MD Responsible Provider: Boston Mejía DO    Anesthesia Type: MAC ASA Status: 1            Anesthesia Type: No value filed.    Kj Phase I: Kj Score: 9    Kj Phase II: Kj Score: 10    Anesthesia Post Evaluation    Patient location during evaluation: PACU  Patient participation: complete - patient participated  Level of consciousness: awake, sleepy but conscious and responsive to verbal stimuli  Pain score: 0  Airway patency: patent  Nausea & Vomiting: no vomiting and no nausea  Cardiovascular status: hemodynamically stable  Respiratory status: acceptable  Hydration status: stable  Comments: Patient seen and examined.  Ready for discharge from PACU.  Pain management: adequate    No notable events documented.

## 2024-06-17 NOTE — PROGRESS NOTES
[facility]  [address] [place] [address] [phone]    [facility] Adult  Hospitalist Group                                                                                          Hospitalist Progress Note  Sharla Soto MD        Date of Service:  2024  NAME:  Sloan Sarkar  :  1933  MRN:  [id number]      Admission Summary:   89 yo male is admitted with anemia 2/2 GI bleed     Interval history / Subjective:     Just returned from colonoscopy, a little drowsy but no complaints      Assessment & Plan:     GI bleed/melena  -EGD  and colonoscopy  did not show any source of bleed   -high risk for pillcam due to severe diverticular disease and risk of pillcam retention  -plan for CT enterography   -cont PPI BID   -GI following     ABLA  -transfused on 6/15 and needs another transfusion today   -cont to monitor     Acute metabolic encephalopathy  Delirium  -liekly due to being in the hospital   -supportive care     Hypotension  -cont IVF  -started midodrine     Parkinsons disease   -cont sinemet     Outisde Records, prior notes, labs, radiology, and medications reviewed     Code status: full  DVT prophylaxis: [facility]        Hospital Problems             Last Modified POA    * (Principal) GI (gastrointestinal hemorrhage) 2024 Yes          Review of Systems:   Pertinent items are noted in HPI.       Vital Signs:    Last 24hrs VS reviewed since prior progress note. Most recent are:  Vitals:    24 1238   BP: 138/86   Pulse: 70   Resp: 16   Temp: 97.5 °F (36.4 °C)   SpO2: 94%         Intake/Output Summary (Last 24 hours) at 2024 1323  Last data filed at 2024 1140  Gross per 24 hour   Intake 1366.66 ml   Output 250 ml   Net 1116.66 ml        Physical Examination:             Constitutional:  No acute distress, cooperative, pleasant    ENT:  Oral mucosa moist, oropharynx benign.    Resp:  CTA bilaterally. No

## 2024-06-18 LAB
ABO + RH BLD: NORMAL
ANION GAP SERPL CALC-SCNC: 5 MMOL/L (ref 5–15)
BASOPHILS # BLD: 0 K/UL (ref 0–0.1)
BASOPHILS NFR BLD: 0 % (ref 0–1)
BLD PROD TYP BPU: NORMAL
BLD PROD TYP BPU: NORMAL
BLOOD BANK BLOOD PRODUCT EXPIRATION DATE: NORMAL
BLOOD BANK BLOOD PRODUCT EXPIRATION DATE: NORMAL
BLOOD BANK DISPENSE STATUS: NORMAL
BLOOD BANK DISPENSE STATUS: NORMAL
BLOOD BANK ISBT PRODUCT BLOOD TYPE: 5100
BLOOD BANK ISBT PRODUCT BLOOD TYPE: 9500
BLOOD BANK PRODUCT CODE: NORMAL
BLOOD BANK PRODUCT CODE: NORMAL
BLOOD BANK UNIT TYPE AND RH: NORMAL
BLOOD BANK UNIT TYPE AND RH: NORMAL
BLOOD GROUP ANTIBODIES SERPL: NORMAL
BPU ID: NORMAL
BPU ID: NORMAL
BUN SERPL-MCNC: 12 MG/DL (ref 6–20)
BUN/CREAT SERPL: 11 (ref 12–20)
CALCIUM SERPL-MCNC: 7.9 MG/DL (ref 8.5–10.1)
CHLORIDE SERPL-SCNC: 115 MMOL/L (ref 97–108)
CO2 SERPL-SCNC: 24 MMOL/L (ref 21–32)
CREAT SERPL-MCNC: 1.05 MG/DL (ref 0.7–1.3)
CROSSMATCH RESULT: NORMAL
CROSSMATCH RESULT: NORMAL
DIFFERENTIAL METHOD BLD: ABNORMAL
EOSINOPHIL # BLD: 0.1 K/UL (ref 0–0.4)
EOSINOPHIL NFR BLD: 2 % (ref 0–7)
ERYTHROCYTE [DISTWIDTH] IN BLOOD BY AUTOMATED COUNT: 15.8 % (ref 11.5–14.5)
GLUCOSE SERPL-MCNC: 92 MG/DL (ref 65–100)
HCT VFR BLD AUTO: 27.6 % (ref 36.6–50.3)
HGB BLD-MCNC: 8.7 G/DL (ref 12.1–17)
IMM GRANULOCYTES # BLD AUTO: 0 K/UL (ref 0–0.04)
IMM GRANULOCYTES NFR BLD AUTO: 0 % (ref 0–0.5)
LYMPHOCYTES # BLD: 0.9 K/UL (ref 0.8–3.5)
LYMPHOCYTES NFR BLD: 17 % (ref 12–49)
MCH RBC QN AUTO: 30.3 PG (ref 26–34)
MCHC RBC AUTO-ENTMCNC: 31.5 G/DL (ref 30–36.5)
MCV RBC AUTO: 96.2 FL (ref 80–99)
MONOCYTES # BLD: 0.6 K/UL (ref 0–1)
MONOCYTES NFR BLD: 11 % (ref 5–13)
NEUTS SEG # BLD: 3.7 K/UL (ref 1.8–8)
NEUTS SEG NFR BLD: 70 % (ref 32–75)
NRBC # BLD: 0 K/UL (ref 0–0.01)
NRBC BLD-RTO: 0 PER 100 WBC
PLATELET # BLD AUTO: 109 K/UL (ref 150–400)
PMV BLD AUTO: 11.2 FL (ref 8.9–12.9)
POTASSIUM SERPL-SCNC: 3.5 MMOL/L (ref 3.5–5.1)
RBC # BLD AUTO: 2.87 M/UL (ref 4.1–5.7)
SODIUM SERPL-SCNC: 144 MMOL/L (ref 136–145)
SPECIMEN EXP DATE BLD: NORMAL
UNIT DIVISION: 0
UNIT DIVISION: 0
UNIT ISSUE DATE/TIME: NORMAL
UNIT ISSUE DATE/TIME: NORMAL
WBC # BLD AUTO: 5.3 K/UL (ref 4.1–11.1)

## 2024-06-18 PROCEDURE — 80048 BASIC METABOLIC PNL TOTAL CA: CPT

## 2024-06-18 PROCEDURE — C9113 INJ PANTOPRAZOLE SODIUM, VIA: HCPCS | Performed by: INTERNAL MEDICINE

## 2024-06-18 PROCEDURE — 94761 N-INVAS EAR/PLS OXIMETRY MLT: CPT

## 2024-06-18 PROCEDURE — 36415 COLL VENOUS BLD VENIPUNCTURE: CPT

## 2024-06-18 PROCEDURE — 85025 COMPLETE CBC W/AUTO DIFF WBC: CPT

## 2024-06-18 PROCEDURE — 6370000000 HC RX 637 (ALT 250 FOR IP): Performed by: INTERNAL MEDICINE

## 2024-06-18 PROCEDURE — 97530 THERAPEUTIC ACTIVITIES: CPT

## 2024-06-18 PROCEDURE — 97116 GAIT TRAINING THERAPY: CPT

## 2024-06-18 PROCEDURE — 2580000003 HC RX 258: Performed by: INTERNAL MEDICINE

## 2024-06-18 PROCEDURE — 6370000000 HC RX 637 (ALT 250 FOR IP): Performed by: FAMILY MEDICINE

## 2024-06-18 PROCEDURE — 6360000002 HC RX W HCPCS: Performed by: INTERNAL MEDICINE

## 2024-06-18 PROCEDURE — 1100000000 HC RM PRIVATE

## 2024-06-18 RX ORDER — MIDODRINE HYDROCHLORIDE 5 MG/1
5 TABLET ORAL 2 TIMES DAILY WITH MEALS
Status: DISCONTINUED | OUTPATIENT
Start: 2024-06-18 | End: 2024-06-22

## 2024-06-18 RX ADMIN — SODIUM CHLORIDE, PRESERVATIVE FREE 10 ML: 5 INJECTION INTRAVENOUS at 21:10

## 2024-06-18 RX ADMIN — MIDODRINE HYDROCHLORIDE 5 MG: 5 TABLET ORAL at 08:42

## 2024-06-18 RX ADMIN — CARBIDOPA AND LEVODOPA 1 TABLET: 10; 100 TABLET ORAL at 21:10

## 2024-06-18 RX ADMIN — MIDODRINE HYDROCHLORIDE 5 MG: 5 TABLET ORAL at 16:20

## 2024-06-18 RX ADMIN — CARBIDOPA AND LEVODOPA 1 TABLET: 10; 100 TABLET ORAL at 08:42

## 2024-06-18 RX ADMIN — CHOLECALCIFEROL TAB 125 MCG (5000 UNIT) 5000 UNITS: 125 TAB at 08:42

## 2024-06-18 RX ADMIN — SODIUM CHLORIDE, PRESERVATIVE FREE 40 MG: 5 INJECTION INTRAVENOUS at 21:10

## 2024-06-18 RX ADMIN — SODIUM CHLORIDE, PRESERVATIVE FREE 10 ML: 5 INJECTION INTRAVENOUS at 08:42

## 2024-06-18 RX ADMIN — CARBIDOPA AND LEVODOPA 1 TABLET: 10; 100 TABLET ORAL at 13:07

## 2024-06-18 RX ADMIN — SODIUM CHLORIDE, PRESERVATIVE FREE 40 MG: 5 INJECTION INTRAVENOUS at 08:42

## 2024-06-18 ASSESSMENT — PAIN SCALES - GENERAL: PAINLEVEL_OUTOF10: 0

## 2024-06-18 NOTE — PROGRESS NOTES
[facility]  [address] [place] [address] [phone]    [facility] Adult  Hospitalist Group                                                                                          Hospitalist Progress Note  Sharla Soto MD        Date of Service:  2024  NAME:  Sloan Sarkar  :  1933  MRN:  [id number]      Admission Summary:   89 yo male is admitted with anemia 2/2 GI bleed     Interval history / Subjective:     Pt denies blood in stool, tolerated full liquid diet this morning      Assessment & Plan:     GI bleed/melena  -EGD  and colonoscopy  did not show any source of bleed   -high risk for pillcam due to severe diverticular disease and risk of pillcam retention  -planned for CT enterography but patient was unable to drink contrast. CTA done instead which did not show any active bleeding  -cont PPI BID   -GI following     ABLA  -transfused on 6/15 and   -cont to monitor     Hypokalemia  -replete and monitor     Acute metabolic encephalopathy  Delirium  -likely due to being in the hospital   -supportive care     Hypotension  -cont IVF  -started midodrine but BP is stable so will wean    Parkinsons disease   -cont sinemet     Outisde Records, prior notes, labs, radiology, and medications reviewed     Code status: full  DVT prophylaxis: [facility]        Hospital Problems             Last Modified POA    * (Principal) GI (gastrointestinal hemorrhage) 2024 Yes          Review of Systems:   Pertinent items are noted in HPI.       Vital Signs:    Last 24hrs VS reviewed since prior progress note. Most recent are:  Vitals:    24 1118   BP: 129/78   Pulse: 73   Resp:    Temp: 98.6 °F (37 °C)   SpO2: 95%         Intake/Output Summary (Last 24 hours) at 2024 1119  Last data filed at 2024 1723  Gross per 24 hour   Intake 300 ml   Output 350 ml   Net -50 ml          Physical Examination:             Constitutional:  No

## 2024-06-18 NOTE — CARE COORDINATION
3:09 PM  06/18/24    Care Management Progress Note    Reason for Admission:   GI (gastrointestinal hemorrhage) [K92.2]  Symptomatic anemia [D64.9]  Gastrointestinal hemorrhage, unspecified gastrointestinal hemorrhage type [K92.2]  Procedure(s) (LRB):  COLONOSCOPY DIAGNOSTIC (N/A)  1 Day Post-Op    Patient Admission Status: Inpatient  RUR: Readmission Risk Score: 14.3    Hospitalization in the last 30 days (Readmission):  No        Transition of care plan:  Ongoing medical management- pt discussed during IDR; GI following.  Anticipated discharge is home with family and home health- [facility] has accepted in [facility] and SOC will be within 24- 48 hours of discharge.  Outpatient follow-up.  Pt's family to transport.      CM met with pt- he reports that he is anxious to go home, he did not have any questions/concerns during visit. CM will continue to follow and assist with discharge needs.    KAILEY Mattson

## 2024-06-18 NOTE — PROGRESS NOTES
Mercedes Huddleston PA-C                       [phone] office             Monday-Friday 8:00 am-4:30 pm  I am not permitted to use \"perfect serve\" use above for contact, thanks.        Gastroenterology Progress Note    June 18, 2024  Admit Date: 6/13/2024         Narrative Assessment and Plan   89 yo M admitted with anemia, hgb 9.7 on admission from prior baseline around 14. He has had diarrhea for several days that has been reported as dark, and melena confirmed on digital rectal exam. Denies abd pain, N/V. Last colonoscopy was in his 60s, his daughter recalls it was likely normal. No prior EGD. On aspirin 81 mg daily.      6/18/24: Bidirectional endoscopy with no evidence of active bleeding: EGD with tortuous esophagus and hiatal hernia, colonoscopy with diverticulosis. Hgb 8.7 today but with no more melena today.     Impression:  Anemia  Melena     Plan:  Monitor for further GI bleeding, if this persists can consider CT enterography.   Pillcam contraindicated at this time due to severe sigmoid diverticulosis.   Mercedes Huddleston PA-C    Concern for sclerosis of sigmoid colon related to hypertrophy / diverticular disease leads to avoidance of pillcam study.  Did not consume oral contrast for CTE, but a CTA was accomplished revealing absence of extravasation of contrast.  Small intestinal evaluation with CT enterography reasonable, but in the interest of minimizing risk for contrast nephropathy, would plan that study tomorrow.  Darren Bradley MD     Subjective:   Chief Complaint: anemia, melena      History of Present Illness: Mr. Sarkar is a 89 yo male past medical history of Parkinson's dementia, prior lacunar infarction, HTN, HLD, prior history of prostate cancer, admitted with anemia and melena x4-5 days. His daughter Eva is POA and provides the majority of the history. He had been struggling with

## 2024-06-19 ENCOUNTER — APPOINTMENT (OUTPATIENT)
Facility: HOSPITAL | Age: 89
DRG: 377 | End: 2024-06-19
Payer: MEDICARE

## 2024-06-19 LAB
COMMENT:: NORMAL
HCT VFR BLD AUTO: 25.9 % (ref 36.6–50.3)
HGB BLD-MCNC: 8.8 G/DL (ref 12.1–17)
SPECIMEN HOLD: NORMAL

## 2024-06-19 PROCEDURE — 85014 HEMATOCRIT: CPT

## 2024-06-19 PROCEDURE — 6360000002 HC RX W HCPCS: Performed by: INTERNAL MEDICINE

## 2024-06-19 PROCEDURE — 94761 N-INVAS EAR/PLS OXIMETRY MLT: CPT

## 2024-06-19 PROCEDURE — 1100000000 HC RM PRIVATE

## 2024-06-19 PROCEDURE — 73562 X-RAY EXAM OF KNEE 3: CPT

## 2024-06-19 PROCEDURE — 97116 GAIT TRAINING THERAPY: CPT

## 2024-06-19 PROCEDURE — 6370000000 HC RX 637 (ALT 250 FOR IP): Performed by: FAMILY MEDICINE

## 2024-06-19 PROCEDURE — 6370000000 HC RX 637 (ALT 250 FOR IP): Performed by: INTERNAL MEDICINE

## 2024-06-19 PROCEDURE — 2580000003 HC RX 258: Performed by: INTERNAL MEDICINE

## 2024-06-19 PROCEDURE — 85018 HEMOGLOBIN: CPT

## 2024-06-19 PROCEDURE — 97530 THERAPEUTIC ACTIVITIES: CPT

## 2024-06-19 PROCEDURE — 36415 COLL VENOUS BLD VENIPUNCTURE: CPT

## 2024-06-19 PROCEDURE — C9113 INJ PANTOPRAZOLE SODIUM, VIA: HCPCS | Performed by: INTERNAL MEDICINE

## 2024-06-19 RX ADMIN — CARBIDOPA AND LEVODOPA 1 TABLET: 10; 100 TABLET ORAL at 14:48

## 2024-06-19 RX ADMIN — CARBIDOPA AND LEVODOPA 1 TABLET: 10; 100 TABLET ORAL at 21:44

## 2024-06-19 RX ADMIN — SODIUM CHLORIDE, PRESERVATIVE FREE 10 ML: 5 INJECTION INTRAVENOUS at 08:59

## 2024-06-19 RX ADMIN — CHOLECALCIFEROL TAB 125 MCG (5000 UNIT) 5000 UNITS: 125 TAB at 08:57

## 2024-06-19 RX ADMIN — MIDODRINE HYDROCHLORIDE 5 MG: 5 TABLET ORAL at 17:47

## 2024-06-19 RX ADMIN — SODIUM CHLORIDE, PRESERVATIVE FREE 40 MG: 5 INJECTION INTRAVENOUS at 08:57

## 2024-06-19 RX ADMIN — ACETAMINOPHEN 650 MG: 325 TABLET ORAL at 09:09

## 2024-06-19 RX ADMIN — SODIUM CHLORIDE, PRESERVATIVE FREE 10 ML: 5 INJECTION INTRAVENOUS at 21:44

## 2024-06-19 RX ADMIN — MIDODRINE HYDROCHLORIDE 5 MG: 5 TABLET ORAL at 08:58

## 2024-06-19 RX ADMIN — CARBIDOPA AND LEVODOPA 1 TABLET: 10; 100 TABLET ORAL at 08:57

## 2024-06-19 RX ADMIN — SODIUM CHLORIDE, PRESERVATIVE FREE 40 MG: 5 INJECTION INTRAVENOUS at 21:43

## 2024-06-19 ASSESSMENT — PAIN SCALES - GENERAL
PAINLEVEL_OUTOF10: 0

## 2024-06-19 NOTE — PROGRESS NOTES
[facility]  [address] [place] [address] [phone]    [facility] Adult  Hospitalist Group                                                                                          Hospitalist Progress Note  Sharla Soto MD        Date of Service:  2024  NAME:  Sloan Sarkar  :  1933  MRN:  [id number]      Admission Summary:   89 yo male is admitted with anemia 2/2 GI bleed     Interval history / Subjective:   Patient denies blood in the stool and denies abdominal pain.  Denies nausea or vomiting     Assessment & Plan:     GI bleed/melena  -EGD  and colonoscopy  did not show any source of bleed   -high risk for pillcam due to severe diverticular disease and risk of pillcam retention  -planned for CT enterography but patient was unable to drink contrast. CTA done instead which did not show any active bleeding  -Plan for repeat attempt at CT enterography today  -cont PPI BID   -GI following     ABLA  -transfused on 6/15 and   -H&H has remained stable so far  -cont to monitor     Hypokalemia  -replete and monitor     Acute metabolic encephalopathy  Delirium  -likely due to being in the hospital   -supportive care     Hypotension  -cont IVF  -started midodrine but BP is stable so will wean    Parkinsons disease   -cont sinemet     Outisde Records, prior notes, labs, radiology, and medications reviewed     Code status: full  DVT prophylaxis: [facility]        Hospital Problems             Last Modified POA    * (Principal) GI (gastrointestinal hemorrhage) 2024 Yes          Review of Systems:   Pertinent items are noted in HPI.       Vital Signs:    Last 24hrs VS reviewed since prior progress note. Most recent are:  Vitals:    24 1124   BP: 108/61   Pulse: 71   Resp: 20   Temp: 98.1 °F (36.7 °C)   SpO2: 100%         Intake/Output Summary (Last 24 hours) at 2024 1208  Last data filed at 2024 0935  Gross per 24 hour

## 2024-06-19 NOTE — PROGRESS NOTES
1444 nurse made Dr. Soto aware that patient's right knee was swollen and unable to ambulate today.    1452 nurse made Dr. Soto aware that patient noted with edema to arms and right knee could fluids be stop.     1453 Per Dr. Soto fluid could be stop.

## 2024-06-19 NOTE — PROGRESS NOTES
Mercedes Huddleston PA-C                       [phone] office             Monday-Friday 8:00 am-4:30 pm  I am not permitted to use \"perfect serve\" use above for contact, thanks.        Gastroenterology Progress Note    June 19, 2024  Admit Date: 6/13/2024         Narrative Assessment and Plan   89 yo M admitted with anemia, hgb 9.7 on admission from prior baseline around 14. He has had diarrhea for several days that has been reported as dark, and melena confirmed on digital rectal exam. Denies abd pain, N/V. Last colonoscopy was in his 60s, his daughter recalls it was likely normal. No prior EGD. On aspirin 81 mg daily.      6/18/24: Bidirectional endoscopy with no evidence of active bleeding: EGD with tortuous esophagus and hiatal hernia, colonoscopy with diverticulosis. Hgb 8.7 today but with no more melena today.     6/19/24: Hgb stable at 8.8, no more BM since 6/17. Denies abd pain, N/V. Creatinine 1.05.     Impression:  Anemia  Melena     Plan:  CT enterography today.   Monitor Hgb, monitor for any more melena or blood in stool.   Mercedes Huddleston PA-C      Subjective:   Chief Complaint: anemia, melena      History of Present Illness: Mr. Sarkar is a 89 yo male past medical history of Parkinson's dementia, prior lacunar infarction, HTN, HLD, prior history of prostate cancer, admitted with anemia and melena x4-5 days. His daughter Eva is POA and provides the majority of the history. He had been struggling with constipation, recently established care with Dr. Zepeda from [facility], had been plannign to have CT colonography done, and was started on Amitiza for constipation; he then developed diarrhea and they were working on adjusting his dose. His daughter saw he diarrhea was darker but did not see much blood in it. In ED, stool was black with maroon. Denies abd pain, N/V. On aspirin, no anticoagulants.     6/18/24:

## 2024-06-20 ENCOUNTER — APPOINTMENT (OUTPATIENT)
Dept: VASCULAR SURGERY | Facility: HOSPITAL | Age: 89
DRG: 377 | End: 2024-06-20
Attending: INTERNAL MEDICINE
Payer: MEDICARE

## 2024-06-20 ENCOUNTER — APPOINTMENT (OUTPATIENT)
Facility: HOSPITAL | Age: 89
DRG: 377 | End: 2024-06-20
Payer: MEDICARE

## 2024-06-20 LAB
HCT VFR BLD AUTO: 22.4 % (ref 36.6–50.3)
HCT VFR BLD AUTO: 23 % (ref 36.6–50.3)
HGB BLD-MCNC: 7.7 G/DL (ref 12.1–17)
HGB BLD-MCNC: 7.8 G/DL (ref 12.1–17)

## 2024-06-20 PROCEDURE — 6370000000 HC RX 637 (ALT 250 FOR IP): Performed by: INTERNAL MEDICINE

## 2024-06-20 PROCEDURE — 85018 HEMOGLOBIN: CPT

## 2024-06-20 PROCEDURE — 6360000002 HC RX W HCPCS: Performed by: INTERNAL MEDICINE

## 2024-06-20 PROCEDURE — 36415 COLL VENOUS BLD VENIPUNCTURE: CPT

## 2024-06-20 PROCEDURE — 74177 CT ABD & PELVIS W/CONTRAST: CPT

## 2024-06-20 PROCEDURE — 93970 EXTREMITY STUDY: CPT

## 2024-06-20 PROCEDURE — 6370000000 HC RX 637 (ALT 250 FOR IP): Performed by: FAMILY MEDICINE

## 2024-06-20 PROCEDURE — 94761 N-INVAS EAR/PLS OXIMETRY MLT: CPT

## 2024-06-20 PROCEDURE — 2580000003 HC RX 258: Performed by: INTERNAL MEDICINE

## 2024-06-20 PROCEDURE — 85014 HEMATOCRIT: CPT

## 2024-06-20 PROCEDURE — 6360000004 HC RX CONTRAST MEDICATION

## 2024-06-20 PROCEDURE — 1100000000 HC RM PRIVATE

## 2024-06-20 PROCEDURE — C9113 INJ PANTOPRAZOLE SODIUM, VIA: HCPCS | Performed by: INTERNAL MEDICINE

## 2024-06-20 RX ADMIN — ACETAMINOPHEN 650 MG: 325 TABLET ORAL at 22:00

## 2024-06-20 RX ADMIN — SODIUM CHLORIDE, PRESERVATIVE FREE 40 MG: 5 INJECTION INTRAVENOUS at 20:55

## 2024-06-20 RX ADMIN — CARBIDOPA AND LEVODOPA 1 TABLET: 10; 100 TABLET ORAL at 11:11

## 2024-06-20 RX ADMIN — CHOLECALCIFEROL TAB 125 MCG (5000 UNIT) 5000 UNITS: 125 TAB at 11:11

## 2024-06-20 RX ADMIN — SODIUM CHLORIDE: 9 INJECTION, SOLUTION INTRAVENOUS at 20:57

## 2024-06-20 RX ADMIN — SODIUM CHLORIDE, PRESERVATIVE FREE 10 ML: 5 INJECTION INTRAVENOUS at 11:25

## 2024-06-20 RX ADMIN — SODIUM CHLORIDE: 9 INJECTION, SOLUTION INTRAVENOUS at 11:25

## 2024-06-20 RX ADMIN — SODIUM CHLORIDE, PRESERVATIVE FREE 40 MG: 5 INJECTION INTRAVENOUS at 11:11

## 2024-06-20 RX ADMIN — SODIUM CHLORIDE, PRESERVATIVE FREE 10 ML: 5 INJECTION INTRAVENOUS at 20:55

## 2024-06-20 RX ADMIN — MIDODRINE HYDROCHLORIDE 5 MG: 5 TABLET ORAL at 17:17

## 2024-06-20 RX ADMIN — MIDODRINE HYDROCHLORIDE 5 MG: 5 TABLET ORAL at 11:11

## 2024-06-20 RX ADMIN — CARBIDOPA AND LEVODOPA 1 TABLET: 10; 100 TABLET ORAL at 20:55

## 2024-06-20 RX ADMIN — CARBIDOPA AND LEVODOPA 1 TABLET: 10; 100 TABLET ORAL at 14:41

## 2024-06-20 RX ADMIN — IOPAMIDOL 100 ML: 755 INJECTION, SOLUTION INTRAVENOUS at 10:27

## 2024-06-20 NOTE — CARE COORDINATION
12:26 PM  06/20/24    Care Management Progress Note    Reason for Admission:   GI (gastrointestinal hemorrhage) [K92.2]  Symptomatic anemia [D64.9]  Gastrointestinal hemorrhage, unspecified gastrointestinal hemorrhage type [K92.2]  Procedure(s) (LRB):  COLONOSCOPY DIAGNOSTIC (N/A)  3 Days Post-Op    Patient Admission Status: Inpatient  RUR: Readmission Risk Score: 14.6    Hospitalization in the last 30 days (Readmission):  No        Transition of care plan:  Ongoing medical management- pt discussed during IDR; GI following and pt to have CT enterography today.  Anticipated discharge plan: Home with home health- [facility] has accepted and can start at [place].  Outpatient follow-up.  Pt's family to transport.    CM updated pt's daughter Eva and will continue to follow.    KAILEY Mattson

## 2024-06-20 NOTE — PROGRESS NOTES
Mercedes Huddleston PA-C                       [phone] office             Monday-Friday 8:00 am-4:30 pm  I am not permitted to use \"perfect serve\" use above for contact, thanks.        Gastroenterology Progress Note    June 20, 2024  Admit Date: 6/13/2024         Narrative Assessment and Plan   89 yo M admitted with anemia, hgb 9.7 on admission from prior baseline around 14. He has had diarrhea for several days that has been reported as dark, and melena confirmed on digital rectal exam. Denies abd pain, N/V. Last colonoscopy was in his 60s, his daughter recalls it was likely normal. No prior EGD. On aspirin 81 mg daily.      6/18/24: Bidirectional endoscopy with no evidence of active bleeding: EGD with tortuous esophagus and hiatal hernia, colonoscopy with diverticulosis. Hgb 8.7 today but with no more melena today.     6/19/24: Hgb stable at 8.8, no more BM since 6/17. Denies abd pain, N/V. Creatinine 1.05.     6/20/24: Hgb decreased to 7.7, patient off the floor for CT enterography at the time of my visit.     Impression:  Anemia  Melena     Plan:  Recommend continuing pantoprazole 40 mg BID, await CT enterography findings, recheck hgb this evening.   Mercedes Huddleston PA-C      Subjective:   Chief Complaint: anemia, melena      History of Present Illness: Mr. Sarkar is a 89 yo male past medical history of Parkinson's dementia, prior lacunar infarction, HTN, HLD, prior history of prostate cancer, admitted with anemia and melena x4-5 days. His daughter Eva is POA and provides the majority of the history. He had been struggling with constipation, recently established care with Dr. Zepeda from [facility], had been plannign to have CT colonography done, and was started on Amitiza for constipation; he then developed diarrhea and they were working on adjusting his dose. His daughter saw he diarrhea was darker but did not see

## 2024-06-20 NOTE — PROGRESS NOTES
[facility]  [address] [place] [address] [phone]    [facility] Adult  Hospitalist Group                                                                                          Hospitalist Progress Note  Sharla Soto MD        Date of Service:  2024  NAME:  Sloan Sarkar  :  1933  MRN:  [id number]      Admission Summary:   89 yo male is admitted with anemia 2/2 GI bleed     Interval history / Subjective:   Patient denies blood in the stool and denies abdominal pain.  Denies nausea or vomiting     Assessment & Plan:     GI bleed/melena  -EGD  and colonoscopy  did not show any source of bleed   -high risk for pillcam due to severe diverticular disease and risk of pillcam retention  -planned for CT enterography but patient was unable to drink contrast. CTA done instead which did not show any active bleeding  -CT enterography attempted again today, results pending  -cont PPI BID   -GI following     ABLA  -transfused on 6/15 and   -H&H has remained stable so far  -cont to monitor     Hypokalemia  -replete and monitor     Acute metabolic encephalopathy  Delirium  -likely due to being in the hospital   -supportive care     Hypotension  -cont IVF  -started midodrine but BP is stable so will wean    Parkinsons disease   -cont sinemet     Outisde Records, prior notes, labs, radiology, and medications reviewed     Code status: full  DVT prophylaxis: [facility]CD[facility]        Hospital Problems             Last Modified POA    * (Principal) GI (gastrointestinal hemorrhage) 2024 Yes          Review of Systems:   Pertinent items are noted in HPI.       Vital Signs:    Last 24hrs VS reviewed since prior progress note. Most recent are:  Vitals:    24 1107   BP: 128/78   Pulse: 66   Resp: 17   Temp: 98.8 °F (37.1 °C)   SpO2: 100%         Intake/Output Summary (Last 24 hours) at 2024 1151  Last data filed at 2024 1802  Gross per 24

## 2024-06-21 ENCOUNTER — APPOINTMENT (OUTPATIENT)
Facility: HOSPITAL | Age: 89
DRG: 377 | End: 2024-06-21
Attending: INTERNAL MEDICINE
Payer: MEDICARE

## 2024-06-21 LAB
ECHO BSA: 1.91 M2
ECHO BSA: 1.91 M2
HCT VFR BLD AUTO: 25.9 % (ref 36.6–50.3)
HGB BLD-MCNC: 8.5 G/DL (ref 12.1–17)

## 2024-06-21 PROCEDURE — 6370000000 HC RX 637 (ALT 250 FOR IP): Performed by: INTERNAL MEDICINE

## 2024-06-21 PROCEDURE — 1100000000 HC RM PRIVATE

## 2024-06-21 PROCEDURE — C1894 INTRO/SHEATH, NON-LASER: HCPCS

## 2024-06-21 PROCEDURE — 2500000003 HC RX 250 WO HCPCS: Performed by: RADIOLOGY

## 2024-06-21 PROCEDURE — 2580000003 HC RX 258: Performed by: INTERNAL MEDICINE

## 2024-06-21 PROCEDURE — 94761 N-INVAS EAR/PLS OXIMETRY MLT: CPT

## 2024-06-21 PROCEDURE — 06H03DZ INSERTION OF INTRALUMINAL DEVICE INTO INFERIOR VENA CAVA, PERCUTANEOUS APPROACH: ICD-10-PCS | Performed by: RADIOLOGY

## 2024-06-21 PROCEDURE — 85014 HEMATOCRIT: CPT

## 2024-06-21 PROCEDURE — 36415 COLL VENOUS BLD VENIPUNCTURE: CPT

## 2024-06-21 PROCEDURE — C9113 INJ PANTOPRAZOLE SODIUM, VIA: HCPCS | Performed by: INTERNAL MEDICINE

## 2024-06-21 PROCEDURE — C1880 VENA CAVA FILTER: HCPCS

## 2024-06-21 PROCEDURE — C1769 GUIDE WIRE: HCPCS

## 2024-06-21 PROCEDURE — 76937 US GUIDE VASCULAR ACCESS: CPT

## 2024-06-21 PROCEDURE — 6360000002 HC RX W HCPCS: Performed by: INTERNAL MEDICINE

## 2024-06-21 PROCEDURE — 6370000000 HC RX 637 (ALT 250 FOR IP): Performed by: FAMILY MEDICINE

## 2024-06-21 PROCEDURE — 85018 HEMOGLOBIN: CPT

## 2024-06-21 PROCEDURE — 2709999900 HC NON-CHARGEABLE SUPPLY

## 2024-06-21 PROCEDURE — 37191 INS ENDOVAS VENA CAVA FILTR: CPT

## 2024-06-21 PROCEDURE — 99222 1ST HOSP IP/OBS MODERATE 55: CPT | Performed by: INTERNAL MEDICINE

## 2024-06-21 RX ORDER — LIDOCAINE HYDROCHLORIDE 10 MG/ML
INJECTION, SOLUTION EPIDURAL; INFILTRATION; INTRACAUDAL; PERINEURAL PRN
Status: COMPLETED | OUTPATIENT
Start: 2024-06-21 | End: 2024-06-21

## 2024-06-21 RX ADMIN — SODIUM CHLORIDE: 9 INJECTION, SOLUTION INTRAVENOUS at 07:51

## 2024-06-21 RX ADMIN — LIDOCAINE HYDROCHLORIDE 2 ML: 10 INJECTION, SOLUTION EPIDURAL; INFILTRATION; INTRACAUDAL; PERINEURAL at 13:45

## 2024-06-21 RX ADMIN — CARBIDOPA AND LEVODOPA 1 TABLET: 10; 100 TABLET ORAL at 21:07

## 2024-06-21 RX ADMIN — SODIUM CHLORIDE, PRESERVATIVE FREE 40 MG: 5 INJECTION INTRAVENOUS at 21:07

## 2024-06-21 RX ADMIN — CARBIDOPA AND LEVODOPA 1 TABLET: 10; 100 TABLET ORAL at 08:21

## 2024-06-21 RX ADMIN — CARBIDOPA AND LEVODOPA 1 TABLET: 10; 100 TABLET ORAL at 15:34

## 2024-06-21 RX ADMIN — SODIUM CHLORIDE, PRESERVATIVE FREE 40 MG: 5 INJECTION INTRAVENOUS at 08:28

## 2024-06-21 RX ADMIN — SODIUM CHLORIDE, PRESERVATIVE FREE 10 ML: 5 INJECTION INTRAVENOUS at 08:21

## 2024-06-21 RX ADMIN — SODIUM CHLORIDE, PRESERVATIVE FREE 10 ML: 5 INJECTION INTRAVENOUS at 21:10

## 2024-06-21 RX ADMIN — CHOLECALCIFEROL TAB 125 MCG (5000 UNIT) 5000 UNITS: 125 TAB at 08:21

## 2024-06-21 NOTE — PROGRESS NOTES
[facility]  [address] [place] [address] [phone]    [facility] Adult  Hospitalist Group                                                                                          Hospitalist Progress Note  Esa León MD        Date of Service:  2024  NAME:  Sloan Sarkar  :  1933  MRN:  [id number]      Admission Summary:   89 yo male is admitted with anemia 2/2 GI bleed     Interval history / Subjective:   No acute events overnight. Imaging yesterday revealed      Assessment & Plan:     GI bleed/melena  -EGD  and colonoscopy  did not show any source of bleed   -high risk for pillcam due to severe diverticular disease and risk of pillcam retention  -CTA neg. CT enterography without obvious source identified.   -cont PPI BID   -GI following - Has improved on PPI and is more stable. May not fully elucidate source of bleed, but will plan to advance diet today    Acute DVT / Chronic PE: Chronic PE noted on CT yesterday so did obtain LE dopplers.  This showed acute DVT. Given the above, I did discuss with daughter and recommended IVC filter, which was successfully placed today    ABLA  -transfused on 6/15 and   -H&H has remained stable so far  -cont to monitor     Hypokalemia  -replete and monitor     Acute metabolic encephalopathy  Delirium  -likely due to being in the hospital and underlying PD dementia  -supportive care     Hypotension  -cont IVF  -started midodrine but BP is stable so will wean    Parkinsons disease   -cont sinemet     Dispo: Pt rec HH, CM has assisted in coordinating HH through [facility].   TOYIN   Code: DDNR confirmed with Palliative care team this morning    Code status: full  DVT prophylaxis: [facility]CDs        Hospital Problems             Last Modified POA    * (Principal) GI (gastrointestinal hemorrhage) 2024 Yes          Review of Systems:   Pertinent items are noted in HPI.       Vital Signs:    Last

## 2024-06-21 NOTE — PROGRESS NOTES
Comprehensive Nutrition Assessment    Type and Reason for Visit:  Initial, RD Nutrition Re-Screen/LOS    Nutrition Recommendations/Plan:   Advance diet per GI and MD after IVC filter procedure.  Please continue to document all PO intake in Flowsheets when diet restarted.     Malnutrition Assessment:  Malnutrition Status:  Insufficient data (06/21/24 1136)    Context:  Acute Illness     Findings of the 6 clinical characteristics of malnutrition:  Energy Intake:  Unable to assess  Weight Loss:  Unable to assess     Body Fat Loss:  No significant body fat loss     Muscle Mass Loss:  No significant muscle mass loss    Fluid Accumulation:  Mild Extremities   Strength:  Not Performed    Nutrition Assessment:     Patient is a 89 year old male admitted with GI (gastrointestinal hemorrhage) [K92.2]  Symptomatic anemia [D64.9]  Gastrointestinal hemorrhage, unspecified gastrointestinal hemorrhage type [K92.2]  He  has a past medical history of Cognitive impairment, Essential hypertension, Hyperlipidemia, Idiopathic peripheral neuropathy, Lacunar infarction (HCC), GUILLAUME (obstructive sleep apnea), Prostate cancer (HCC), Vascular dementia (HCC), Vascular parkinsonism (HCC), and Vitamin D deficiency. No family in room at time of visit. Pt poor historian. Pt stated he weighs 165-170 lbs. Obtained bed scale weight of 88.8 kg (195.4 lbs). UBW is ~185 lbs per pt. Lack of weight history documentation, pt did not report wt loss. Pt reports good PO and appetite. Per documentation PO has been poor, 0-25% on average. Interested in tomato soup and grilled cheese for lunch. Currently NPO for IVC filter placement. Advance diet per GI and MD when appropriate. Pt denies n/v and chewing/swallowing difficulties. Last documented BM on 6/18, has not used PRN medications for constipation. Consider changing bowel regimen to scheduled.       Nutrition Related Findings:      Wound Type: None     Last BM: 06/18/24  Edema: Right lower extremity, Left

## 2024-06-21 NOTE — CONSENT
This writer and co-worker Tess Chavarria called daughter Eva Lay to obtain consent for ordered IVC filter.  Procedure explained to daughter with all questions answered and consent granted for procedure, also reviewed the DNR addendum form with daughter who verbally agreed to suspend DNR for procedure, both forms signed with two staff members.

## 2024-06-21 NOTE — CONSULTS
Palliative Medicine  Patient Name: Sloan Sarkar  YOB: 1933  MRN: [id number]  Age: 89 y.o.  Gender: male    Date of Initial Consult: 6/21/24  Date of Service: 6/21/2024  Time: 12:52 PM  Provider: Yane Alberto MD  Hospital Day: 9  Admit Date: 6/13/2024  Referring Provider: Hospitalist       Reasons for Consultation:  Goals of Care    HISTORY OF PRESENT ILLNESS (HPI):   Sloan Sarkar is a 89 y.o. male with a past medical history of chronic GIB, Parkinsons's disease, who was admitted on 6/13/2024 from home with a diagnosis of acute on chronic GIB. We were asked to help with identification of goals of care.    Baseline functional status- Lives on the second floor of his house, son and DIL live on the first floor. Son fixes breakfast, patient able to re heat meals made by daughter over the weekend. He walks without assistance at home, does his dishes, keeps up with household chores, etc. Daughter is his mPOA.        PALLIATIVE DIAGNOSES:    Acute on chronic GIB  Parkinson's disease  Acute metabolic encephalopathy  Delirium    ASSESSMENT AND PLAN:   Met with patient and daughter Eva at bedside.  Both patient and daughter are clear about wanting medical procedures and diagnostics to help with acute condition only. No life prolonging measures. Patient is clear about DNR, daughter states DDNR present at home. She is also his mPOA.  Plan to return home when diagnostics complete. Would want rehospitalization for any acute treatable conditions such as GIB, infection, etc. This is reasonable given patient's good quality of life. Delirium improved now.  Initial consult note routed to primary continuity provider and/or primary health care team members  Discussed management of patient with primary team  Please call with any palliative questions or concerns.  Palliative Care Team is available via perfect serve or via phone.    Referrals to:   [] Outpatient Palliative Care  [] Home Based Palliative Care  [] Home

## 2024-06-21 NOTE — PROGRESS NOTES
TRANSFER - OUT REPORT:    Verbal report given to Jaydon RN(name) on Sloan Sarkar being transferred to ortho(unit) for routine progression of patient care       Report consisted of patient's Situation, Background, Assessment and   Recommendations(SBAR).     Information from the following report(s) Nurse Handoff Report was reviewed with the receiving nurse.    Opportunity for questions and clarification was provided.      Patient transported with:   Registered Nurse

## 2024-06-21 NOTE — PROGRESS NOTES
Physical Therapy    1024 chart reviewed, spoke with RN. Pt (+) for DVT LLE, not receiving anticoagulation therapy 2/2 possible GI bleed. Ongoing work up for possible IVC filter. PT will HOLD at this time and follow up once medically stable

## 2024-06-21 NOTE — SIGNIFICANT EVENT
Notified by nursing of + DVT per duplex scan, as reported by TECH. Pt has continued to refuse use of SCD's, due to concern for GI bleed will hold anticoagulation at thsi time.     Encouraged nursing to attempt to apply throughout the night.

## 2024-06-21 NOTE — PROGRESS NOTES
Spiritual Care Assessment/Progress Note  [facility]    Name: Sloan Sarkar MRN: [id number]    Age: 89 y.o.     Sex: male   Language: English     Date: 6/21/2024            Total Time Calculated: 27 min              Spiritual Assessment begun in [facility] B[facility] 2  Service Provided For: Patient     Encounter Overview/Reason: Initial Encounter    Spiritual beliefs:      [x] Involved in a alee tradition/spiritual practice:  Druze/Yazdanism      [] Supported by a alee community:      [] Claims no spiritual orientation:      [] Seeking spiritual identity:           [] Adheres to an individual form of spirituality:      [] Not able to assess:                Identified resources for coping and support system:   Support System: Children, Family members       [x] Prayer                  [] Devotional reading               [] Music                  [] Guided Imagery     [] Pet visits                                        [] Other: (COMMENT)     Specific area/focus of visit   Encounter:    Crisis:    Spiritual/Emotional needs: Type: Spiritual Support  Ritual, Rites and Sacraments:    Grief, Loss, and Adjustments:    Ethics/Mediation:    Behavioral Health:    Palliative Care:    Advance Care Planning:           Narrative:   Spiritual care assessment with Mr. Sloan Sarkra on B4.  Reviewed chart notes prior to visit. Pt is awake in bed and welcoming. He shares enthusiastically life review; he has 3 grown kids, 2 sons and daughter that he is pleased with. He shared that he has attended Yazdanism all his life until about a year and half ago. He expressed that he is anxious to get back home because he has chores to do and to feed horses. After sharing life stories, he expressed no needs at this time. Please contact Spiritual Care for any emotional and spiritual needs and/or referrals. Thank you.    Chaplain Dixon Washington M.Div., [facility].  [facility] Team [phone]

## 2024-06-21 NOTE — CARE COORDINATION
3:12 PM  06/21/24      Care Management Progress Note    Reason for Admission:   GI (gastrointestinal hemorrhage) [K92.2]  Symptomatic anemia [D64.9]  Gastrointestinal hemorrhage, unspecified gastrointestinal hemorrhage type [K92.2]  Procedure(s) (LRB):  COLONOSCOPY DIAGNOSTIC (N/A)  4 Days Post-Op    Patient Admission Status: Inpatient  RUR: Readmission Risk Score: 14.1    Hospitalization in the last 30 days (Readmission):  No        Transition of care plan:  Ongoing medical management- pt discussed during IDR, pt had IVC successfully placed today.  Anticipated home with home health- CM confirmed that [facility] has accepted and ready for SOC when pt is discharged.  Outpatient follow-up.  Pt's family to transport- CM left message for pt's daughter Eva ([phone]) to confirm that she can transport pt home at discharge. Updated her on pt status and possible dc over weekend.    CM will follow.    KAILEY Mattson

## 2024-06-21 NOTE — PROGRESS NOTES
Felisha ELBA Hdz, [facility]  [phone] office  [phone] voicemail     Gastroenterology Progress Note    June 21, 2024  Admit Date: 6/13/2024         Narrative Assessment and Plan   89 yo M admitted with anemia, hgb 9.7 on admission from prior baseline around 14. He has had diarrhea for several days that has been reported as dark, and melena confirmed on digital rectal exam. Denies abd pain, N/V. Last colonoscopy was in his 60s, his daughter recalls it was likely normal. No prior EGD. On aspirin 81 mg daily.     6/18/24: Bidirectional endoscopy with no evidence of active bleeding: EGD with tortuous esophagus and hiatal hernia, colonoscopy with diverticulosis. Hgb 8.7 today but with no more melena today.     6/19/24: Hgb stable at 8.8, no more BM since 6/17. Denies abd pain, N/V. Creatinine 1.05.     6/20/24: Hgb decreased to 7.7, patient off the floor for CT enterography at the time of my visit.     6/21/24: Hb 8.5, no reports of melena. CT enterography without any GI findings.    Impression:  Anemia  Melena     Plan:  Continue PPI BID. Will hold off EGD/push enteroscopy for now as he appears stable. Could consider outpatient patency capsule and VCE, will d/w Dr. Story.    Subjective:   Slow to wake. Resting comfortably. Denies pain. No reported bleeding.      ROS:  The previous review of systems on initial consultation / H&P is noted and reviewed.  Specific changes noted above in HPI.    Current Medications:     Current Facility-Administered Medications   Medication Dose Route Frequency    midodrine (PROAMATINE) tablet 5 mg  5 mg Oral BID WC    0.9 % sodium chloride infusion   IntraVENous PRN    0.9 % sodium chloride infusion   IntraVENous PRN    [Held by provider] metoprolol succinate (TOPROL XL) extended release tablet 25 mg  25 mg Oral Daily    sodium chloride flush 0.9 % injection 5-40 mL  5-40 mL IntraVENous 2 times per day    sodium chloride flush 0.9 % injection 5-40 mL  5-40 mL

## 2024-06-22 LAB
HCT VFR BLD AUTO: 23.5 % (ref 36.6–50.3)
HGB BLD-MCNC: 7.9 G/DL (ref 12.1–17)

## 2024-06-22 PROCEDURE — 6360000002 HC RX W HCPCS: Performed by: INTERNAL MEDICINE

## 2024-06-22 PROCEDURE — 2500000003 HC RX 250 WO HCPCS: Performed by: INTERNAL MEDICINE

## 2024-06-22 PROCEDURE — C9113 INJ PANTOPRAZOLE SODIUM, VIA: HCPCS | Performed by: INTERNAL MEDICINE

## 2024-06-22 PROCEDURE — 6370000000 HC RX 637 (ALT 250 FOR IP): Performed by: INTERNAL MEDICINE

## 2024-06-22 PROCEDURE — 2580000003 HC RX 258: Performed by: INTERNAL MEDICINE

## 2024-06-22 PROCEDURE — 6370000000 HC RX 637 (ALT 250 FOR IP)

## 2024-06-22 PROCEDURE — 5A09357 ASSISTANCE WITH RESPIRATORY VENTILATION, LESS THAN 24 CONSECUTIVE HOURS, CONTINUOUS POSITIVE AIRWAY PRESSURE: ICD-10-PCS | Performed by: INTERNAL MEDICINE

## 2024-06-22 PROCEDURE — 97530 THERAPEUTIC ACTIVITIES: CPT

## 2024-06-22 PROCEDURE — 36415 COLL VENOUS BLD VENIPUNCTURE: CPT

## 2024-06-22 PROCEDURE — 6360000002 HC RX W HCPCS

## 2024-06-22 PROCEDURE — 2000000000 HC ICU R&B

## 2024-06-22 PROCEDURE — 94660 CPAP INITIATION&MGMT: CPT

## 2024-06-22 PROCEDURE — 85014 HEMATOCRIT: CPT

## 2024-06-22 PROCEDURE — 6370000000 HC RX 637 (ALT 250 FOR IP): Performed by: FAMILY MEDICINE

## 2024-06-22 PROCEDURE — 85018 HEMOGLOBIN: CPT

## 2024-06-22 RX ORDER — EPINEPHRINE 1 MG/ML
0.3 INJECTION, SOLUTION, CONCENTRATE INTRAVENOUS ONCE
Status: COMPLETED | OUTPATIENT
Start: 2024-06-22 | End: 2024-06-22

## 2024-06-22 RX ORDER — METHYLPREDNISOLONE SODIUM SUCCINATE 125 MG/2ML
INJECTION, POWDER, LYOPHILIZED, FOR SOLUTION INTRAMUSCULAR; INTRAVENOUS
Status: COMPLETED
Start: 2024-06-22 | End: 2024-06-22

## 2024-06-22 RX ORDER — IPRATROPIUM BROMIDE AND ALBUTEROL SULFATE 2.5; .5 MG/3ML; MG/3ML
1 SOLUTION RESPIRATORY (INHALATION)
Status: DISCONTINUED | OUTPATIENT
Start: 2024-06-22 | End: 2024-06-23

## 2024-06-22 RX ORDER — IPRATROPIUM BROMIDE AND ALBUTEROL SULFATE 2.5; .5 MG/3ML; MG/3ML
1 SOLUTION RESPIRATORY (INHALATION) ONCE
Status: COMPLETED | OUTPATIENT
Start: 2024-06-22 | End: 2024-06-22

## 2024-06-22 RX ORDER — DIPHENHYDRAMINE HYDROCHLORIDE 50 MG/ML
25 INJECTION INTRAMUSCULAR; INTRAVENOUS ONCE
Status: COMPLETED | OUTPATIENT
Start: 2024-06-22 | End: 2024-06-22

## 2024-06-22 RX ORDER — DIPHENHYDRAMINE HYDROCHLORIDE 50 MG/ML
25 INJECTION INTRAMUSCULAR; INTRAVENOUS ONCE
OUTPATIENT
Start: 2024-06-22 | End: 2024-06-22

## 2024-06-22 RX ORDER — IPRATROPIUM BROMIDE AND ALBUTEROL SULFATE 2.5; .5 MG/3ML; MG/3ML
SOLUTION RESPIRATORY (INHALATION)
Status: COMPLETED
Start: 2024-06-22 | End: 2024-06-22

## 2024-06-22 RX ORDER — MAGNESIUM SULFATE IN WATER 40 MG/ML
2000 INJECTION, SOLUTION INTRAVENOUS ONCE
Status: COMPLETED | OUTPATIENT
Start: 2024-06-22 | End: 2024-06-22

## 2024-06-22 RX ADMIN — CHOLECALCIFEROL TAB 125 MCG (5000 UNIT) 5000 UNITS: 125 TAB at 09:52

## 2024-06-22 RX ADMIN — SODIUM CHLORIDE, PRESERVATIVE FREE 10 ML: 5 INJECTION INTRAVENOUS at 21:06

## 2024-06-22 RX ADMIN — SODIUM CHLORIDE, PRESERVATIVE FREE 5 ML: 5 INJECTION INTRAVENOUS at 09:53

## 2024-06-22 RX ADMIN — METHYLPREDNISOLONE SODIUM SUCCINATE 125 MG: 125 INJECTION INTRAMUSCULAR; INTRAVENOUS at 18:31

## 2024-06-22 RX ADMIN — IPRATROPIUM BROMIDE AND ALBUTEROL SULFATE 1 DOSE: 2.5; .5 SOLUTION RESPIRATORY (INHALATION) at 18:44

## 2024-06-22 RX ADMIN — DIPHENHYDRAMINE HYDROCHLORIDE 25 MG: 50 INJECTION INTRAMUSCULAR; INTRAVENOUS at 18:38

## 2024-06-22 RX ADMIN — MAGNESIUM SULFATE HEPTAHYDRATE 2000 MG: 40 INJECTION, SOLUTION INTRAVENOUS at 20:50

## 2024-06-22 RX ADMIN — MIDODRINE HYDROCHLORIDE 5 MG: 5 TABLET ORAL at 09:52

## 2024-06-22 RX ADMIN — SODIUM CHLORIDE 300 MG: 9 INJECTION, SOLUTION INTRAVENOUS at 16:57

## 2024-06-22 RX ADMIN — EPINEPHRINE 0.3 MG: 1 INJECTION, SOLUTION, CONCENTRATE INTRAVENOUS at 21:12

## 2024-06-22 RX ADMIN — FAMOTIDINE 20 MG: 10 INJECTION, SOLUTION INTRAVENOUS at 20:59

## 2024-06-22 RX ADMIN — CARBIDOPA AND LEVODOPA 1 TABLET: 10; 100 TABLET ORAL at 20:58

## 2024-06-22 RX ADMIN — IPRATROPIUM BROMIDE AND ALBUTEROL SULFATE 1 DOSE: .5; 3 SOLUTION RESPIRATORY (INHALATION) at 18:44

## 2024-06-22 RX ADMIN — SODIUM CHLORIDE, PRESERVATIVE FREE 40 MG: 5 INJECTION INTRAVENOUS at 21:04

## 2024-06-22 RX ADMIN — IPRATROPIUM BROMIDE AND ALBUTEROL SULFATE 1 DOSE: .5; 3 SOLUTION RESPIRATORY (INHALATION) at 18:45

## 2024-06-22 RX ADMIN — CARBIDOPA AND LEVODOPA 1 TABLET: 10; 100 TABLET ORAL at 14:41

## 2024-06-22 RX ADMIN — CARBIDOPA AND LEVODOPA 1 TABLET: 10; 100 TABLET ORAL at 09:52

## 2024-06-22 RX ADMIN — SODIUM CHLORIDE, PRESERVATIVE FREE 40 MG: 5 INJECTION INTRAVENOUS at 09:52

## 2024-06-22 NOTE — PROGRESS NOTES
Rapid Called at 1819    Responded to RRT at 1819 for SOB    Provider at bedside: YES  Interventions ordered: Other (Comment), Bipap, solu-medro, benedryl. Duo neb.   Sepsis Suspected: No  Transfer to Higher Level of Care: yes    Pt started to SOB, and wheezing after iron infusion. RRT was called. MD at bedside ordered solu-medro, benedryl, and Bipap for transfer pt to ICU. Now pt is in ICU, care is transferred to ICU.     Vitals:    06/22/24 1848   BP:    Pulse: (!) 137   Resp: (!) 41   Temp:    SpO2: 95%        Rapid Ended at 1930  RRT RN assisted with transport to accepting unit Yes.    Kurt Kohli, RN

## 2024-06-22 NOTE — PROGRESS NOTES
Gastroenterology attending progress note    Subjective  Patient tolerating a diet without GI distress symptoms and regular bowel habits    Objective  .  Vitals:    06/22/24 1153   BP: 125/74   Pulse: (!) 145   Resp: 18   Temp: 99.3 °F (37.4 °C)   SpO2: 97%   General-comfortable, conversant  HEENT-calvarium atraumatic, sclera icteric  Chest-normal respiratory excursion bilaterally abdomen-nondistended, nontender    Clinical data reviewed  Laboratory data 6/22 hemoglobin 7.9 stable over the last several days    Diagnostic imaging reviewed  CT enterography without focal GI bleeding lesions on this admission    Assessment and plan  89-year-old gentleman presenting to [facility] with dark stools, acute blood loss anemia, EGD with large hiatal hernia without Suleiman's erosions, colonoscopy with extensive sigmoid colon diverticulosis, likely contraindication to have VCE with concern for capsule retention in the sigmoid colon diverticula, CTA negative, CTA negative.  Ongoing care discussed with the patient's daughter  Recommendations  - Without recurrent overt GI bleeding would favor resumption of low-dose aspirin for primary cardio prevention, maintenance Protonix 40 mg once daily  - I would prefer intravenous iron infusion to avoid confounding dark stools associated with iron intake for GI bleeding  - Would avoid anticoagulant and other antiplatelet medications unless strongly indicated in the future as no clear bleeding source was identified  - I suspect that the bleeding source in this patient may be a flat lesion in the small bowel which is a vascular ectasia which typically presents as a low risk bleeding.  This patient with advanced age of 89, frailty and multiple comorbidities, I would not pursue more aggressive evaluation such as attempted video capsule endoscopy or balloon assisted enteroscopy as the patient's bleeding has resolved, he is clinically stable with stable hemogram, stable vital signs

## 2024-06-22 NOTE — PROGRESS NOTES
Hospitalist Progress Note      NAME:  Sloan Sarkar   :  1933  MRM:  [id number]    Date/Time: 2024  3:16 PM           Assessment / Plan:     89M hx HTN p/w GIB    #Acute GIB / Symptomatic Anemia: Seen and evaluated by GI and underwent EGD, which showed large hiatal without stanley's erosions; colonoscopy with extensive diverticulosis; CTA neg. Suspicion that lesion may be in small bowel. At this point he has stabilized with PPI and no further aspiring. Plan for now:   - Cont PPI daily   - Hold ASA   - Give Iron infusion   - Hopeful for DC tmr    #Acute DVT / Chronic PE: This was noted on CT, and subsequent doppler. Given the above he had IVC filter placed .     #R knee severe OA: Impacting his mobility. Not a surgical candidate at the moment and will dc with HH PT    #Parkinson's Disease: Likely with mild dementia as well. Supportive family who desire to take pt home. Cont sinemet    #HTN: Have been holding meds and even needed midodrine   - stop midodrine   - resume metop      #BMI (Calculated): 26.62    I have personally reviewed the radiographs, laboratory data in Epic and decisions and statements above are based partially on this personal interpretation.                 Care Plan discussed with: Patient, Family, Care Manager, and Nursing Staff    Discussed:  Care Plan    Prophylaxis:  SCD's    Disposition:  Home w/Family           ___________________________________________________    Attending Physician: Esa León MD        Subjective:     Chief Complaint:  No acute events overnight. Daughter at bedside. All questions answered.     ROS:  (bold if positive, if negative)    Tolerating PT  Tolerating Diet          Objective:       Vitals:          Last 24hrs VS reviewed since prior progress note. Most recent are:    Vitals:    24 1230   BP:    Pulse: 74   Resp:    Temp:    SpO2:      SpO2 Readings from Last 6 Encounters:   24 97%          Intake/Output Summary (Last 24 hours)

## 2024-06-22 NOTE — PROGRESS NOTES
Called to RRT for respiratory distress. Pt had received 60 min of a 90 min infusion of 300mg venofar and began having respiratory distress. On my arrival he is in extremis with RR > 50, audible wheezes and stridor, accessory muscle use. HR 150s. He received IV methylpred 135mg, benadryl 25mg IV, duoneb x 2 and was placed on BiPAP with improvement, but still tenuous status. EpiPen was at bedside but not administered given improvement.     I did review DDNR with daughter who confirms that given the acute allergic reaction being a potentially rapidly reversible respiratory condition she would be ok with intubation in the short term. I have updated his code status to FCFT this evening. If and when stabilized, will resume DNR status.       D/W Intensivist.     Total CC time exclusive of procedures: 45 min

## 2024-06-22 NOTE — PROGRESS NOTES
Pharmacy Dosing Services: 6/22/24    Pharmacist Renal Dosing  Note for Famotidine   Physician Dr. León    Indication:   Previous Regimen 20 mg bid   Serum Creatinine Lab Results   Component Value Date/Time    CREATININE 1.05 06/18/2024 07:59 AM      Creatinine Clearance Estimated Creatinine Clearance: 44 mL/min (based on SCr of 1.05 mg/dL).   BUN Lab Results   Component Value Date/Time    BUN 12 06/18/2024 07:59 AM         Plan:  20mg daily    Nasima Ramsey, PharmD, BCPS

## 2024-06-23 ENCOUNTER — APPOINTMENT (OUTPATIENT)
Facility: HOSPITAL | Age: 89
DRG: 377 | End: 2024-06-23
Payer: MEDICARE

## 2024-06-23 VITALS
TEMPERATURE: 98.3 F | HEART RATE: 58 BPM | OXYGEN SATURATION: 97 % | SYSTOLIC BLOOD PRESSURE: 134 MMHG | DIASTOLIC BLOOD PRESSURE: 70 MMHG | WEIGHT: 201.5 LBS | HEIGHT: 67 IN | RESPIRATION RATE: 17 BRPM | BODY MASS INDEX: 31.63 KG/M2

## 2024-06-23 LAB
ANION GAP SERPL CALC-SCNC: 7 MMOL/L (ref 5–15)
BASOPHILS # BLD: 0 K/UL (ref 0–0.1)
BASOPHILS NFR BLD: 0 % (ref 0–1)
BUN SERPL-MCNC: 13 MG/DL (ref 6–20)
BUN/CREAT SERPL: 11 (ref 12–20)
CALCIUM SERPL-MCNC: 8.2 MG/DL (ref 8.5–10.1)
CHLORIDE SERPL-SCNC: 111 MMOL/L (ref 97–108)
CO2 SERPL-SCNC: 23 MMOL/L (ref 21–32)
CREAT SERPL-MCNC: 1.15 MG/DL (ref 0.7–1.3)
DIFFERENTIAL METHOD BLD: ABNORMAL
EOSINOPHIL # BLD: 0 K/UL (ref 0–0.4)
EOSINOPHIL NFR BLD: 0 % (ref 0–7)
ERYTHROCYTE [DISTWIDTH] IN BLOOD BY AUTOMATED COUNT: 14.9 % (ref 11.5–14.5)
GLUCOSE SERPL-MCNC: 207 MG/DL (ref 65–100)
HCT VFR BLD AUTO: 24.1 % (ref 36.6–50.3)
HGB BLD-MCNC: 8.1 G/DL (ref 12.1–17)
IMM GRANULOCYTES # BLD AUTO: 0.1 K/UL (ref 0–0.04)
IMM GRANULOCYTES NFR BLD AUTO: 1 % (ref 0–0.5)
LYMPHOCYTES # BLD: 0.3 K/UL (ref 0.8–3.5)
LYMPHOCYTES NFR BLD: 2 % (ref 12–49)
MCH RBC QN AUTO: 29.8 PG (ref 26–34)
MCHC RBC AUTO-ENTMCNC: 33.6 G/DL (ref 30–36.5)
MCV RBC AUTO: 88.6 FL (ref 80–99)
MONOCYTES # BLD: 0.3 K/UL (ref 0–1)
MONOCYTES NFR BLD: 2 % (ref 5–13)
NEUTS SEG # BLD: 11.9 K/UL (ref 1.8–8)
NEUTS SEG NFR BLD: 95 % (ref 32–75)
NRBC # BLD: 0 K/UL (ref 0–0.01)
NRBC BLD-RTO: 0 PER 100 WBC
PLATELET # BLD AUTO: 143 K/UL (ref 150–400)
PMV BLD AUTO: 10.8 FL (ref 8.9–12.9)
POTASSIUM SERPL-SCNC: 3.2 MMOL/L (ref 3.5–5.1)
RBC # BLD AUTO: 2.72 M/UL (ref 4.1–5.7)
SODIUM SERPL-SCNC: 141 MMOL/L (ref 136–145)
WBC # BLD AUTO: 12.6 K/UL (ref 4.1–11.1)

## 2024-06-23 PROCEDURE — 80048 BASIC METABOLIC PNL TOTAL CA: CPT

## 2024-06-23 PROCEDURE — 2580000003 HC RX 258: Performed by: INTERNAL MEDICINE

## 2024-06-23 PROCEDURE — C9113 INJ PANTOPRAZOLE SODIUM, VIA: HCPCS | Performed by: INTERNAL MEDICINE

## 2024-06-23 PROCEDURE — 71045 X-RAY EXAM CHEST 1 VIEW: CPT

## 2024-06-23 PROCEDURE — 6360000002 HC RX W HCPCS: Performed by: INTERNAL MEDICINE

## 2024-06-23 PROCEDURE — 36415 COLL VENOUS BLD VENIPUNCTURE: CPT

## 2024-06-23 PROCEDURE — 6370000000 HC RX 637 (ALT 250 FOR IP): Performed by: INTERNAL MEDICINE

## 2024-06-23 PROCEDURE — 85025 COMPLETE CBC W/AUTO DIFF WBC: CPT

## 2024-06-23 RX ORDER — PANTOPRAZOLE SODIUM 40 MG/1
40 TABLET, DELAYED RELEASE ORAL
Qty: 90 TABLET | Refills: 1 | Status: SHIPPED | OUTPATIENT
Start: 2024-06-23

## 2024-06-23 RX ORDER — IPRATROPIUM BROMIDE AND ALBUTEROL SULFATE 2.5; .5 MG/3ML; MG/3ML
1 SOLUTION RESPIRATORY (INHALATION) EVERY 4 HOURS PRN
Status: DISCONTINUED | OUTPATIENT
Start: 2024-06-23 | End: 2024-06-23 | Stop reason: HOSPADM

## 2024-06-23 RX ADMIN — SODIUM CHLORIDE, PRESERVATIVE FREE 40 MG: 5 INJECTION INTRAVENOUS at 12:25

## 2024-06-23 RX ADMIN — SODIUM CHLORIDE, PRESERVATIVE FREE 10 ML: 5 INJECTION INTRAVENOUS at 00:22

## 2024-06-23 RX ADMIN — CARBIDOPA AND LEVODOPA 1 TABLET: 10; 100 TABLET ORAL at 12:24

## 2024-06-23 RX ADMIN — SODIUM CHLORIDE, PRESERVATIVE FREE 10 ML: 5 INJECTION INTRAVENOUS at 09:00

## 2024-06-23 RX ADMIN — WATER 40 MG: 1 INJECTION INTRAMUSCULAR; INTRAVENOUS; SUBCUTANEOUS at 06:05

## 2024-06-23 RX ADMIN — WATER 40 MG: 1 INJECTION INTRAMUSCULAR; INTRAVENOUS; SUBCUTANEOUS at 00:21

## 2024-06-23 RX ADMIN — CHOLECALCIFEROL TAB 125 MCG (5000 UNIT) 5000 UNITS: 125 TAB at 12:24

## 2024-06-23 ASSESSMENT — PAIN SCALES - GENERAL
PAINLEVEL_OUTOF10: 0
PAINLEVEL_OUTOF10: 0

## 2024-06-23 NOTE — PLAN OF CARE
Problem: Discharge Planning  Goal: Discharge to home or other facility with appropriate resources  6/14/2024 1314 by Yumiko Bower RN  Outcome: /[facility] Progressing  6/14/2024 0654 by Deja Suazo RN  Outcome: Progressing     Problem: Safety - Adult  Goal: Free from fall injury  6/14/2024 1314 by Yumiko Bower, RN  Outcome: /[facility] Progressing  6/14/2024 0654 by Deja Suazo RN  Outcome: Progressing     Problem: Gastrointestinal - Adult  Goal: Maintains or returns to baseline bowel function  6/14/2024 1314 by Yumiko Bower RN  Outcome: /[facility] Progressing  6/14/2024 0654 by Deja Suazo RN  Outcome: Progressing     
  Problem: Discharge Planning  Goal: Discharge to home or other facility with appropriate resources  6/18/2024 1024 by Laney Trujillo RN  Outcome: Progressing  6/18/2024 0157 by Jovanny Islas RN  Outcome: Progressing     Problem: Safety - Adult  Goal: Free from fall injury  6/18/2024 1024 by Laney Trujillo RN  Outcome: Progressing  6/18/2024 0157 by Jovanny Islas RN  Outcome: Progressing     Problem: Gastrointestinal - Adult  Goal: Maintains or returns to baseline bowel function  6/18/2024 1024 by Laney Trujillo RN  Outcome: Progressing  6/18/2024 0157 by Jovanny Islas RN  Outcome: Progressing     Problem: Pain  Goal: Verbalizes/displays adequate comfort level or baseline comfort level  6/18/2024 1024 by Laney Trujillo RN  Outcome: Progressing  6/18/2024 0157 by Jovanny Islas RN  Outcome: Progressing     
  Problem: Discharge Planning  Goal: Discharge to home or other facility with appropriate resources  6/20/2024 1909 by Jone Barrera RN  Outcome: Progressing  6/20/2024 1137 by Saul Ramsey RN  Outcome: Progressing     Problem: Safety - Adult  Goal: Free from fall injury  6/20/2024 1909 by Jone Barrera RN  Outcome: Progressing  6/20/2024 1137 by Saul Ramsey RN  Outcome: Progressing     Problem: Gastrointestinal - Adult  Goal: Maintains or returns to baseline bowel function  6/20/2024 1909 by Jone Barrera RN  Outcome: Progressing  6/20/2024 1137 by Saul Ramsey RN  Outcome: Progressing     Problem: Skin/Tissue Integrity  Goal: Absence of new skin breakdown  Description: 1.  Monitor for areas of redness and/or skin breakdown  2.  Assess vascular access sites hourly  3.  Every 4-6 hours minimum:  Change oxygen saturation probe site  4.  Every 4-6 hours:  If on nasal continuous positive airway pressure, respiratory therapy assess nares and determine need for appliance change or resting period.  6/20/2024 1909 by Jone Barrera RN  Outcome: Progressing  6/20/2024 1137 by Saul Ramsey RN  Outcome: Progressing     Problem: Pain  Goal: Verbalizes/displays adequate comfort level or baseline comfort level  6/20/2024 1909 by Jone Barrera RN  Outcome: Progressing  6/20/2024 1137 by Saul Ramsey RN  Outcome: Progressing     
  Problem: Discharge Planning  Goal: Discharge to home or other facility with appropriate resources  6/21/2024 0218 by Gladys Ballesteros LPN  Outcome: Progressing  6/20/2024 1909 by Jone Barrera RN  Outcome: Progressing     Problem: Safety - Adult  Goal: Free from fall injury  6/21/2024 0218 by Gladys Ballesteros LPN  Outcome: Progressing  6/20/2024 1909 by Jone Barrera RN  Outcome: Progressing     Problem: Gastrointestinal - Adult  Goal: Maintains or returns to baseline bowel function  6/21/2024 0218 by Gladys Ballesteros LPN  Outcome: Progressing  6/20/2024 1909 by Jone Barrera RN  Outcome: Progressing     Problem: Skin/Tissue Integrity  Goal: Absence of new skin breakdown  Description: 1.  Monitor for areas of redness and/or skin breakdown  2.  Assess vascular access sites hourly  3.  Every 4-6 hours minimum:  Change oxygen saturation probe site  4.  Every 4-6 hours:  If on nasal continuous positive airway pressure, respiratory therapy assess nares and determine need for appliance change or resting period.  6/21/2024 0218 by Gladys Ballesteros LPN  Outcome: Progressing  6/20/2024 1909 by Jone Barrera RN  Outcome: Progressing     Problem: Pain  Goal: Verbalizes/displays adequate comfort level or baseline comfort level  6/21/2024 0218 by Gladys Ballesteros LPN  Outcome: Progressing  6/20/2024 1909 by Jone Barrera RN  Outcome: Progressing     
  Problem: Discharge Planning  Goal: Discharge to home or other facility with appropriate resources  6/21/2024 1141 by Jaydon Tijerina RN  Outcome: Progressing  6/21/2024 0218 by Gladys Ballesteros LPN  Outcome: Progressing     Problem: Safety - Adult  Goal: Free from fall injury  6/21/2024 1141 by Jaydon Tijerina RN  Outcome: Progressing  6/21/2024 0218 by Gladys Ballesteros LPN  Outcome: Progressing     Problem: Gastrointestinal - Adult  Goal: Maintains or returns to baseline bowel function  6/21/2024 1141 by Jaydon Tijerina RN  Outcome: Progressing  6/21/2024 0218 by Gladys Ballesteros LPN  Outcome: Progressing     Problem: Skin/Tissue Integrity  Goal: Absence of new skin breakdown  Description: 1.  Monitor for areas of redness and/or skin breakdown  2.  Assess vascular access sites hourly  3.  Every 4-6 hours minimum:  Change oxygen saturation probe site  4.  Every 4-6 hours:  If on nasal continuous positive airway pressure, respiratory therapy assess nares and determine need for appliance change or resting period.  6/21/2024 1141 by Jaydon Tijerina RN  Outcome: Progressing  6/21/2024 0218 by Gladys Ballesteros LPN  Outcome: Progressing     Problem: Pain  Goal: Verbalizes/displays adequate comfort level or baseline comfort level  6/21/2024 1141 by Jaydon Tijerina RN  Outcome: Progressing  6/21/2024 0218 by Gladys Ballesteros LPN  Outcome: Progressing     
  Problem: Discharge Planning  Goal: Discharge to home or other facility with appropriate resources  6/22/2024 0032 by Sarah Good LPN  Outcome: Progressing  6/21/2024 1141 by Jaydon Tijerina RN  Outcome: Progressing     Problem: Safety - Adult  Goal: Free from fall injury  6/22/2024 0032 by Sarah Good LPN  Outcome: Progressing  6/21/2024 1141 by Jaydon Tijerina RN  Outcome: Progressing     Problem: Gastrointestinal - Adult  Goal: Maintains or returns to baseline bowel function  6/22/2024 0032 by Sarah Good LPN  Outcome: Progressing  6/21/2024 1141 by Jaydon Tijerina RN  Outcome: Progressing     Problem: Skin/Tissue Integrity  Goal: Absence of new skin breakdown  Description: 1.  Monitor for areas of redness and/or skin breakdown  2.  Assess vascular access sites hourly  3.  Every 4-6 hours minimum:  Change oxygen saturation probe site  4.  Every 4-6 hours:  If on nasal continuous positive airway pressure, respiratory therapy assess nares and determine need for appliance change or resting period.  6/22/2024 0032 by Sarah Good LPN  Outcome: Progressing  6/21/2024 1141 by Jaydon Tijerina RN  Outcome: Progressing     Problem: Pain  Goal: Verbalizes/displays adequate comfort level or baseline comfort level  6/22/2024 0032 by Sarah Good LPN  Outcome: Progressing  6/21/2024 1141 by Jaydon Tijerina, RN  Outcome: Progressing     
  Problem: Discharge Planning  Goal: Discharge to home or other facility with appropriate resources  6/23/2024 1317 by Danita Lay RN  Outcome: Completed  6/23/2024 1317 by Danita Lay RN  Outcome: Adequate for Discharge  Flowsheets (Taken 6/23/2024 0800)  Discharge to home or other facility with appropriate resources:   Arrange for needed discharge resources and transportation as appropriate   Identify barriers to discharge with patient and caregiver   Identify discharge learning needs (meds, wound care, etc)   Refer to discharge planning if patient needs post-hospital services based on physician order or complex needs related to functional status, cognitive ability or social support system     Problem: Safety - Adult  Goal: Free from fall injury  6/23/2024 1317 by Danita Lay RN  Outcome: Completed  6/23/2024 1317 by Danita Lay RN  Outcome: Adequate for Discharge  Flowsheets (Taken 6/23/2024 0800)  Free From Fall Injury:   Instruct family/caregiver on patient safety   Based on caregiver fall risk screen, instruct family/caregiver to ask for assistance with transferring infant if caregiver noted to have fall risk factors     Problem: Gastrointestinal - Adult  Goal: Maintains or returns to baseline bowel function  6/23/2024 1317 by Danita Lay RN  Outcome: Completed  6/23/2024 1317 by Danita Lay RN  Outcome: Adequate for Discharge  Flowsheets (Taken 6/23/2024 0800)  Maintains or returns to baseline bowel function:   Assess bowel function   Encourage oral fluids to ensure adequate hydration   Administer IV fluids as ordered to ensure adequate hydration   Administer ordered medications as needed   Encourage mobilization and activity   Nutrition consult to assist patient with appropriate food choices     Problem: Skin/Tissue Integrity  Goal: Absence of new skin breakdown  Description: 1.  Monitor for areas of redness and/or skin breakdown  2.  Assess vascular access sites hourly  3.  Every 4-6 hours 
  Problem: Discharge Planning  Goal: Discharge to home or other facility with appropriate resources  Outcome: Adequate for Discharge  Flowsheets (Taken 6/23/2024 0800)  Discharge to home or other facility with appropriate resources:   Arrange for needed discharge resources and transportation as appropriate   Identify barriers to discharge with patient and caregiver   Identify discharge learning needs (meds, wound care, etc)   Refer to discharge planning if patient needs post-hospital services based on physician order or complex needs related to functional status, cognitive ability or social support system     Problem: Safety - Adult  Goal: Free from fall injury  Outcome: Adequate for Discharge  Flowsheets (Taken 6/23/2024 0800)  Free From Fall Injury:   Instruct family/caregiver on patient safety   Based on caregiver fall risk screen, instruct family/caregiver to ask for assistance with transferring infant if caregiver noted to have fall risk factors     Problem: Gastrointestinal - Adult  Goal: Maintains or returns to baseline bowel function  Outcome: Adequate for Discharge  Flowsheets (Taken 6/23/2024 0800)  Maintains or returns to baseline bowel function:   Assess bowel function   Encourage oral fluids to ensure adequate hydration   Administer IV fluids as ordered to ensure adequate hydration   Administer ordered medications as needed   Encourage mobilization and activity   Nutrition consult to assist patient with appropriate food choices     Problem: Skin/Tissue Integrity  Goal: Absence of new skin breakdown  Description: 1.  Monitor for areas of redness and/or skin breakdown  2.  Assess vascular access sites hourly  3.  Every 4-6 hours minimum:  Change oxygen saturation probe site  4.  Every 4-6 hours:  If on nasal continuous positive airway pressure, respiratory therapy assess nares and determine need for appliance change or resting period.  Outcome: Adequate for Discharge     Problem: Pain  Goal: 
  Problem: Discharge Planning  Goal: Discharge to home or other facility with appropriate resources  Outcome: [facility] Progressing     Problem: Safety - Adult  Goal: Free from fall injury  Outcome: HH[facility] Progressing     Problem: Gastrointestinal - Adult  Goal: Maintains or returns to baseline bowel function  Outcome: [facility] Progressing     Problem: Pain  Goal: Verbalizes/displays adequate comfort level or baseline comfort level  Outcome: [facility] Progressing     
  Problem: Discharge Planning  Goal: Discharge to home or other facility with appropriate resources  Outcome: Progressing     Problem: Safety - Adult  Goal: Free from fall injury  Outcome: Progressing     Problem: Gastrointestinal - Adult  Goal: Maintains or returns to baseline bowel function  Outcome: Progressing     
  Problem: Discharge Planning  Goal: Discharge to home or other facility with appropriate resources  Outcome: Progressing     Problem: Safety - Adult  Goal: Free from fall injury  Outcome: Progressing     Problem: Gastrointestinal - Adult  Goal: Maintains or returns to baseline bowel function  Outcome: Progressing     Problem: Skin/Tissue Integrity  Goal: Absence of new skin breakdown  Description: 1.  Monitor for areas of redness and/or skin breakdown  2.  Assess vascular access sites hourly  3.  Every 4-6 hours minimum:  Change oxygen saturation probe site  4.  Every 4-6 hours:  If on nasal continuous positive airway pressure, respiratory therapy assess nares and determine need for appliance change or resting period.  Outcome: Progressing     Problem: Pain  Goal: Verbalizes/displays adequate comfort level or baseline comfort level  Outcome: Progressing     
  Problem: Discharge Planning  Goal: Discharge to home or other facility with appropriate resources  Outcome: Progressing  Flowsheets (Taken 6/16/2024 0729)  Discharge to home or other facility with appropriate resources:   Identify barriers to discharge with patient and caregiver   Arrange for needed discharge resources and transportation as appropriate   Identify discharge learning needs (meds, wound care, etc)   Refer to discharge planning if patient needs post-hospital services based on physician order or complex needs related to functional status, cognitive ability or social support system     Problem: Safety - Adult  Goal: Free from fall injury  Outcome: Progressing     Problem: Gastrointestinal - Adult  Goal: Maintains or returns to baseline bowel function  Outcome: Progressing  Flowsheets (Taken 6/16/2024 0729)  Maintains or returns to baseline bowel function:   Assess bowel function   Encourage oral fluids to ensure adequate hydration   Administer IV fluids as ordered to ensure adequate hydration   Administer ordered medications as needed   Encourage mobilization and activity   Nutrition consult to assist patient with appropriate food choices     Problem: Skin/Tissue Integrity  Goal: Absence of new skin breakdown  Description: 1.  Monitor for areas of redness and/or skin breakdown  2.  Assess vascular access sites hourly  3.  Every 4-6 hours minimum:  Change oxygen saturation probe site  4.  Every 4-6 hours:  If on nasal continuous positive airway pressure, respiratory therapy assess nares and determine need for appliance change or resting period.  Outcome: Progressing     Problem: Pain  Goal: Verbalizes/displays adequate comfort level or baseline comfort level  Outcome: Progressing  Flowsheets (Taken 6/16/2024 0729)  Verbalizes/displays adequate comfort level or baseline comfort level:   Encourage patient to monitor pain and request assistance   Assess pain using appropriate pain scale   Administer 
  Problem: Physical Therapy - Adult  Goal: By Discharge: Performs mobility at highest level of function for planned discharge setting.  See evaluation for individualized goals.  Description: FUNCTIONAL STATUS PRIOR TO ADMISSION: Patient was modified independent using a walking stick for functional mobility.    HOME SUPPORT PRIOR TO ADMISSION: The patient lived with son who was able to provide assistance at unclear level.    Physical Therapy Goals  Initiated 6/15/2024  1.  Patient will move from supine to sit and sit to supine in bed with modified independence within 7 day(s).    2.  Patient will perform sit to stand with modified independence within 7 day(s).  3.  Patient will transfer from bed to chair and chair to bed with modified independence using the least restrictive device within 7 day(s).  4.  Patient will ambulate with modified independence for 150 feet with the least restrictive device within 7 day(s).   5.  Patient will ascend/descend 4 stairs with unilateral handrail(s) with supervision/set-up within 7 day(s).    Outcome: Progressing   PHYSICAL THERAPY EVALUATION    Patient: Sloan Sarkar (89 y.o. male)  Date: 6/15/2024  Primary Diagnosis: GI (gastrointestinal hemorrhage) [K92.2]  Symptomatic anemia [D64.9]  Gastrointestinal hemorrhage, unspecified gastrointestinal hemorrhage type [K92.2]  Procedure(s) (LRB):  ESOPHAGOGASTRODUODENOSCOPY (N/A) 1 Day Post-Op   Precautions: Restrictions/Precautions: General Precautions, Fall Risk                      ASSESSMENT :   DEFICITS/IMPAIRMENTS:   The patient is limited by decreased functional mobility, independence in ADLs, strength, safety awareness, cognition. Pt with GI hemorrhage; Current comorbidities impacting mobility include vascular dementia with parkinsonism. Pt ambulatory with walking stick at home and son available for assistance, however further discussion re pt baseline with family recommended as able. Vitals stable throughout session. Pt required 
  Problem: Physical Therapy - Adult  Goal: By Discharge: Performs mobility at highest level of function for planned discharge setting.  See evaluation for individualized goals.  Description: FUNCTIONAL STATUS PRIOR TO ADMISSION: Patient was modified independent using a walking stick for functional mobility.    HOME SUPPORT PRIOR TO ADMISSION: The patient lived with son who was able to provide assistance at unclear level.    Physical Therapy Goals  Initiated 6/15/2024  1.  Patient will move from supine to sit and sit to supine in bed with modified independence within 7 day(s).    2.  Patient will perform sit to stand with modified independence within 7 day(s).  3.  Patient will transfer from bed to chair and chair to bed with modified independence using the least restrictive device within 7 day(s).  4.  Patient will ambulate with modified independence for 150 feet with the least restrictive device within 7 day(s).   5.  Patient will ascend/descend 4 stairs with unilateral handrail(s) with supervision/set-up within 7 day(s).    Outcome: Progressing   PHYSICAL THERAPY TREATMENT    Patient: Sloan Sarkar (89 y.o. male)  Date: 6/19/2024  Diagnosis: GI (gastrointestinal hemorrhage) [K92.2]  Symptomatic anemia [D64.9]  Gastrointestinal hemorrhage, unspecified gastrointestinal hemorrhage type [K92.2] GI (gastrointestinal hemorrhage)  Procedure(s) (LRB):  COLONOSCOPY DIAGNOSTIC (N/A) 2 Days Post-Op  Precautions: General Precautions, Fall Risk                      ASSESSMENT:  Patient continues to benefit from skilled PT services and is slowly progressing towards goals. Pt requires significant more assistance today with mobility. Reporting R knee pain, RN notified. Mod A for bed mobility and SPT to chair, pt unable to tolerate short distance gait training 2/2 knee pain,severe fwd flexed posture, poor safety awareness with transfer and use of RW- chair brought to patient. Denies SOB, dizziness, lightheadedness during session. 
  Problem: Physical Therapy - Adult  Goal: By Discharge: Performs mobility at highest level of function for planned discharge setting.  See evaluation for individualized goals.  Description: FUNCTIONAL STATUS PRIOR TO ADMISSION: Patient was modified independent using a walking stick for functional mobility.    HOME SUPPORT PRIOR TO ADMISSION: The patient lived with son who was able to provide assistance at unclear level.    Physical Therapy Goals  Initiated 6/15/2024  1.  Patient will move from supine to sit and sit to supine in bed with modified independence within 7 day(s).    2.  Patient will perform sit to stand with modified independence within 7 day(s).  3.  Patient will transfer from bed to chair and chair to bed with modified independence using the least restrictive device within 7 day(s).  4.  Patient will ambulate with modified independence for 150 feet with the least restrictive device within 7 day(s).   5.  Patient will ascend/descend 4 stairs with unilateral handrail(s) with supervision/set-up within 7 day(s).    Outcome: Progressing   PHYSICAL THERAPY TREATMENT    Patient: Sloan Sarkar (89 y.o. male)  Date: 6/22/2024  Diagnosis: GI (gastrointestinal hemorrhage) [K92.2]  Symptomatic anemia [D64.9]  Gastrointestinal hemorrhage, unspecified gastrointestinal hemorrhage type [K92.2] GI (gastrointestinal hemorrhage)  Procedure(s) (LRB):  COLONOSCOPY DIAGNOSTIC (N/A) 5 Days Post-Op  Precautions: General Precautions, Fall Risk                      ASSESSMENT:  Patient continues to benefit from skilled PT services.Pt supine to sit with mod assist.Pt min assist sit to stand.Pt took 4 steps to chair with RW mod assist.Pt was unsteady with slightly  LOB to posterior during transfer.Reports being limited by right knee pain.Pt is a high fall risk.Pt progressing slowly.Continue goals.       PLAN:  Patient continues to benefit from skilled intervention to address the above impairments.  Continue treatment per 
PTA  Outcome: Progressing     
independence within 7 day(s).  3.  Patient will transfer from bed to chair and chair to bed with modified independence using the least restrictive device within 7 day(s).  4.  Patient will ambulate with modified independence for 150 feet with the least restrictive device within 7 day(s).   5.  Patient will ascend/descend 4 stairs with unilateral handrail(s) with supervision/set-up within 7 day(s).    6/18/2024 1240 by Patsy Xiong, PTA  Outcome: Progressing     
within reach, and Caregiver / family present      COMMUNICATION/EDUCATION:   The patient's plan of care was discussed with: registered nurse    Patient Education  Education Given To: Patient;Family  Education Provided: Precautions;Role of Therapy;Transfer Training  Education Method: Verbal  Barriers to Learning: Cognition  Education Outcome: Continued education needed      Patsy Xiong, PTA  Minutes: 25

## 2024-06-23 NOTE — FLOWSHEET NOTE
06/22/24 1817   Vital Signs   Temp 98.4 °F (36.9 °C)  (rapid called)   Temp Source Oral   Pulse (!) 140   Respirations (!) 40   BP (!) 189/110   MAP (Calculated) 136   Oxygen Therapy   SpO2 (!) 89 %   O2 Device None (Room air)       Rapid called at 1817, patient c/o feeling very cold, and difficulty breathing, shortness of breath, RR 40s, audible wheeze. Initially placed on 2 L NC , then respiratory with MD order placed on BIPAP.  Dr León ordered duo nebs, solu medrol iv and benadryl IV. IV iron sucrose infusion stopped and listed as allergy. Gave bedside nursing handoff report to [place] [id number] ICU, at approx 1912.   99% O2 sat on BIPAP with RR 36-40, at transfer.

## 2024-06-23 NOTE — CONSULTS
[facility]  CRITICAL CARE    Tele ICU TEAM Consult Note    Name: Sloan Sarkar   : 1933   MRN: [id number]   Date: 2024           ICU Assessment     Anaphylactic Reaction  Vascular Dementia  Diarrhea            ICU Comprehensive Plan of Care:      Transfer to MICU  Critically Ill  Serial Neuro Exams  Awake and alert  Acute Hypoxemic Respiratory Failure  Keep SpO2 > 92%  Steroids / BD  BP Stable  Keep MAP > 65  NPO / IVF  Monitor renal function  Strict I/O's  H1 / H2 Blocker  DVT prophylaxis        Discussed Care Plan with Bedside RN       Subjective:   Progress Note: 2024      Reason for ICU Admission: Anaphylaxis     HPI:     89y old m with a PMHx of Vascular dementia who states that he lost his wife 6 years ago when she  of breast cancer.  He was living by himself, but recently his son and son's wife moved in to stay with him.    Patient had a RRT and found to have an anaphylactic reaction. MICU called for evaluation.       Active Problem List:     [unfilled]    Past Medical History:      has a past medical history of Cognitive impairment, Essential hypertension, Hyperlipidemia, Idiopathic peripheral neuropathy, Lacunar infarction (HCC), GUILLAUME (obstructive sleep apnea), Prostate cancer (HCC), Vascular dementia (HCC), Vascular parkinsonism (HCC), and Vitamin D deficiency.    Past Surgical History:      has a past surgical history that includes Upper gastrointestinal endoscopy (N/A, 2024); Colonoscopy (N/A, 2024); and IR GUIDED IVC FILTER PLACEMENT (2024).    Home Medications:     Prior to Admission medications    Medication Sig Start Date End Date Taking? Authorizing Provider   aspirin 81 MG EC tablet aspirin 81 mg tablet,delayed release   Take 1 tablet every day by oral route.    Automatic Reconciliation, Ar   carbidopa-levodopa (SINEMET)  MG per tablet Take 1 tablet by mouth 3 times daily 6/15/22   Automatic Reconciliation, Ar   vitamin D3 (CHOLECALCIFEROL) 125 MCG (5000

## 2024-06-23 NOTE — DISCHARGE SUMMARY
Hospitalist Discharge Summary     Patient ID:  Sloan Sarkar  [id number]  89 y.o.  7/24/1933    Admit date: 6/13/2024    Discharge date and time: 6/23/2024    Admission Diagnoses: GI (gastrointestinal hemorrhage) [K92.2]  Symptomatic anemia [D64.9]  Gastrointestinal hemorrhage, unspecified gastrointestinal hemorrhage type [K92.2]    Discharge Diagnoses:    Principal Problem:    GI (gastrointestinal hemorrhage)  Resolved Problems:    * No resolved hospital problems. *         Hospital Course:   89M hx HTN p/w GIB     #Acute GIB / Symptomatic Anemia: Seen and evaluated by GI and underwent EGD, which showed large hiatal without stanley's erosions; colonoscopy with extensive diverticulosis; CTA neg. Suspicion that lesion may be in small bowel. At this point he has stabilized with PPI. Discharges on once daily PPI, advised no ASA or NSAIDs     #Acute DVT / Chronic PE: This was noted on CT, and subsequent doppler. Given the above he had IVC filter placed 6/21.     #Anaphylaxis to Venofer: Severe reaction with respiratory distress and nearly required intubation on 6/22 evening. He improved with BiPAP, steroids, benadryl. This has been added to his allergy list.      #R knee severe OA: Impacting his mobility. Not a surgical candidate at the moment and will dc with  PT     #Parkinson's Disease: Likely with mild dementia as well. Supportive family who desire to take pt home. Cont sinemet     #HTN: Stop losar/hctz as he has not needed it here. Can resume metop.        Imaging  IR GUIDED IVC FILTER PLACEMENT    Result Date: 6/21/2024  1. Successful potentially retrievable inferior vena cava filter placement. Electronically signed by Romina Chun    CT ENTEROGRAPHY W CONTRAST    Result Date: 6/20/2024  1. Third spacing, with small bilateral pleural effusions, trace ascites, and mild anasarca. 2. Chronic PE in the LLL PA. Electronically signed by Kristian Odonnell    XR KNEE RIGHT (3 VIEWS)    Result Date:

## 2024-06-23 NOTE — DISCHARGE INSTRUCTIONS
HOSPITALIST DISCHARGE INSTRUCTIONS  NAME:  Sloan Sarkar   :  1933   MRN:  [id number]     Date/Time:  2024 8:17 AM    ADMIT DATE: 2024     DISCHARGE DATE: 2024     DISCHARGE DIAGNOSIS:  Acute Blood Loss Anemia  Gastrointestinal Bleeding  Blood clot in leg and lung  Anaphylactic reaction to IV Iron    DISCHARGE INSTRUCTIONS:  Thank you for allowing us to participate in your care. Your discharging Hospitalist is Esa León MD. You were admitted for evaluation and treatment of the above. The source of your bleed was not completely clear, but you likely had some small bleed in your small intestine. You also were found to have diverticula, which can bleed. For your GI Bleed, we recommend once daily acid reducer (pantoprazole) and not taking any aspirin or NSAIDs (ibuprofen, etc.)    You were also found to have a blood clot in your leg and lung. Because we can't use a blood thinner due to your bleeding, we placed a filter to help protect the clot from harming you.     Your course was complicated by an anaphylactic reaction to IV Iron. YOU SHOULD NEVER RECEIVE IV IRON AGAIN IN THE FUTURE.     You will discharge home today with Home Health therapy in place. Please continue to follow up with your PCP and GI teams to monitor your anemia.      MEDICATIONS:    It is important that you take the medication exactly as they are prescribed.   Keep your medication in the bottles provided by the pharmacist and keep a list of the medication names, dosages, and times to be taken in your wallet.   Do not take other medications without consulting your doctor.             If you experience any of the following symptoms then please call your primary care physician or return to the emergency room if you cannot get hold of your doctor:  Fever, chills, nausea, vomiting, diarrhea, change in mentation, falling, bleeding, shortness of breath    Follow Up:  Please call the below provider to arrange hospital follow up

## 2024-06-23 NOTE — CARE COORDINATION
Case Management Discharge Note    Discharge Plan:  DC to home today and resume services with [facility]--Accepted.    AVS and DC Summary uploaded to [employer].    Transportation at DC:  Family is assisting.    Medical treatment team informed of updates.    ______________________________________  CYNDEE Hughes, RN-CM  [facility]- Care Management  Available via [employer]  6/23/2024  3:39 PM

## 2024-06-23 NOTE — PROGRESS NOTES
1300- Discharge instructions reviewed with patient and his Daughter, Eva. PIVs removed x2. Patient dressed with Fariba, Pct and leaving unit to car via wheelchair. No questions from patient or daughter at time of discharge.

## 2024-06-24 LAB
COMMENT:: NORMAL
SPECIMEN HOLD: NORMAL

## 2024-07-01 ENCOUNTER — TELEPHONE (OUTPATIENT)
Age: 89
End: 2024-07-01

## 2024-07-01 NOTE — TELEPHONE ENCOUNTER
Called patient to set up new patient appt. No answer. Left     Np / papito / Kulwinder delgado       Schedule with Felisha on 7/16 or 7/17

## 2024-07-16 ENCOUNTER — OFFICE VISIT (OUTPATIENT)
Age: 89
End: 2024-07-16
Payer: MEDICARE

## 2024-07-16 VITALS
HEIGHT: 67 IN | RESPIRATION RATE: 16 BRPM | DIASTOLIC BLOOD PRESSURE: 81 MMHG | OXYGEN SATURATION: 96 % | WEIGHT: 187.6 LBS | SYSTOLIC BLOOD PRESSURE: 173 MMHG | HEART RATE: 53 BPM | TEMPERATURE: 97.6 F | BODY MASS INDEX: 29.44 KG/M2

## 2024-07-16 DIAGNOSIS — D50.0 IRON DEFICIENCY ANEMIA DUE TO CHRONIC BLOOD LOSS: ICD-10-CM

## 2024-07-16 DIAGNOSIS — K59.04 CHRONIC IDIOPATHIC CONSTIPATION: ICD-10-CM

## 2024-07-16 DIAGNOSIS — G20.A1 DEMENTIA DUE TO PARKINSON'S DISEASE, UNSPECIFIED DEMENTIA SEVERITY, UNSPECIFIED WHETHER BEHAVIORAL, PSYCHOTIC, OR MOOD DISTURBANCE OR ANXIETY (HCC): ICD-10-CM

## 2024-07-16 DIAGNOSIS — D50.0 IRON DEFICIENCY ANEMIA DUE TO CHRONIC BLOOD LOSS: Primary | ICD-10-CM

## 2024-07-16 DIAGNOSIS — F02.80 DEMENTIA DUE TO PARKINSON'S DISEASE, UNSPECIFIED DEMENTIA SEVERITY, UNSPECIFIED WHETHER BEHAVIORAL, PSYCHOTIC, OR MOOD DISTURBANCE OR ANXIETY (HCC): ICD-10-CM

## 2024-07-16 DIAGNOSIS — K92.2 GASTROINTESTINAL HEMORRHAGE, UNSPECIFIED GASTROINTESTINAL HEMORRHAGE TYPE: ICD-10-CM

## 2024-07-16 PROCEDURE — 1123F ACP DISCUSS/DSCN MKR DOCD: CPT | Performed by: NURSE PRACTITIONER

## 2024-07-16 PROCEDURE — 1111F DSCHRG MED/CURRENT MED MERGE: CPT | Performed by: NURSE PRACTITIONER

## 2024-07-16 PROCEDURE — G8427 DOCREV CUR MEDS BY ELIG CLIN: HCPCS | Performed by: NURSE PRACTITIONER

## 2024-07-16 PROCEDURE — 99204 OFFICE O/P NEW MOD 45 MIN: CPT | Performed by: NURSE PRACTITIONER

## 2024-07-16 PROCEDURE — 1036F TOBACCO NON-USER: CPT | Performed by: NURSE PRACTITIONER

## 2024-07-16 PROCEDURE — G8419 CALC BMI OUT NRM PARAM NOF/U: HCPCS | Performed by: NURSE PRACTITIONER

## 2024-07-16 RX ORDER — LUBIPROSTONE 8 UG/1
8 CAPSULE ORAL
COMMUNITY
Start: 2024-06-04

## 2024-07-16 RX ORDER — DOCUSATE SODIUM 100 MG/1
200 CAPSULE, LIQUID FILLED ORAL 3 TIMES DAILY PRN
Qty: 60 CAPSULE | Refills: 0 | Status: SHIPPED | OUTPATIENT
Start: 2024-07-16

## 2024-07-16 RX ORDER — POLYETHYLENE GLYCOL 3350 17 G/17G
17 POWDER, FOR SOLUTION ORAL 2 TIMES DAILY PRN
Qty: 1530 G | Refills: 1 | Status: SHIPPED | OUTPATIENT
Start: 2024-07-16

## 2024-07-16 RX ORDER — FERROUS SULFATE 325(65) MG
325 TABLET, DELAYED RELEASE (ENTERIC COATED) ORAL
Qty: 90 TABLET | Refills: 2 | Status: SHIPPED | OUTPATIENT
Start: 2024-07-16

## 2024-07-16 ASSESSMENT — PATIENT HEALTH QUESTIONNAIRE - PHQ9
SUM OF ALL RESPONSES TO PHQ QUESTIONS 1-9: 0
SUM OF ALL RESPONSES TO PHQ QUESTIONS 1-9: 0
SUM OF ALL RESPONSES TO PHQ9 QUESTIONS 1 & 2: 0
2. FEELING DOWN, DEPRESSED OR HOPELESS: NOT AT ALL
1. LITTLE INTEREST OR PLEASURE IN DOING THINGS: NOT AT ALL
SUM OF ALL RESPONSES TO PHQ QUESTIONS 1-9: 0
SUM OF ALL RESPONSES TO PHQ QUESTIONS 1-9: 0

## 2024-07-16 NOTE — PROGRESS NOTES
Chief Complaint   Patient presents with    New Patient           Vitals:    07/16/24 1425   BP: (!) 173/81   Pulse: 53   Resp: 16   Temp: 97.6 °F (36.4 °C)   SpO2: 96%            1. Have you been to the ER, urgent care clinic since your last visit?  Hospitalized since your last visit?  New Patient  2. Have you seen or consulted any other health care providers outside of the [facility] since your last visit?  Include any pap smears or colon screening. New Patient

## 2024-07-16 NOTE — PROGRESS NOTES
[facility] [place]  Medical Oncology at [place]  [phone]    Hematology / Oncology Consult    Reason for Visit:   Sloan Sarkar is a 89 y.o. male who is seen in consultation at the request of Dr. Kulwinder Ross for evaluation of iron deficiency anemia.        History of Present Illness:   Sloan Sarkar is a 89 y.o. male who presents for evaluation of iron deficiency anemia.    He reports feeling okay today. He has Parkinson's dementia so daughter provides most of history.  He was admitted 6/13-6/23 for acute GI bleeding after seeking evaluation for dark diarrhea stools.  He had an severe allergic reaction to IV Venofer.  Diagnosed with PE and DVT during his admission, IVC filter placed but no anticoagulation due to bleeding. He takes Amitiza every other day for chronic constipation. No recurrence of diarrhea or bloody stool since d/c. Denies abdominal pain, melena or BRBPR since d/c.     He is accompanied by his daughter, Eva, today.      He lives at home with his son and daughter in law.     Past Medical History:   Diagnosis Date    Cognitive impairment     Essential hypertension     Hyperlipidemia     Idiopathic peripheral neuropathy     Lacunar infarction (HCC)     Old lacunar infarcts with severe white matter disease per head CT of 11/4/2021    GUILLAUME (obstructive sleep apnea)     Prostate cancer (HCC)     Vascular dementia (HCC)     Vascular parkinsonism (HCC)     Vitamin D deficiency       Past Surgical History:   Procedure Laterality Date    COLONOSCOPY N/A 6/17/2024    COLONOSCOPY DIAGNOSTIC performed by Felix Story MD at [facility] ENDOSCOPY    IR IVC FILTER PLACEMENT W IMAGING  6/21/2024    IR IVC FILTER PLACEMENT W IMAGING 6/21/2024 [facility] CARDIAC CATH/EP/IR LAB    UPPER GASTROINTESTINAL ENDOSCOPY N/A 6/14/2024    ESOPHAGOGASTRODUODENOSCOPY performed by Felix Story MD at [facility] ENDOSCOPY      Social History     Tobacco Use    Smoking status: Never    Smokeless tobacco: Never   Substance Use Topics

## 2024-09-09 DIAGNOSIS — D50.0 IRON DEFICIENCY ANEMIA DUE TO CHRONIC BLOOD LOSS: ICD-10-CM

## 2024-09-10 RX ORDER — DOCUSATE SODIUM 100 MG/1
CAPSULE, LIQUID FILLED ORAL
Qty: 60 CAPSULE | Refills: 0 | Status: SHIPPED | OUTPATIENT
Start: 2024-09-10

## 2024-09-28 DIAGNOSIS — D50.0 IRON DEFICIENCY ANEMIA DUE TO CHRONIC BLOOD LOSS: ICD-10-CM

## 2024-09-30 RX ORDER — DOCUSATE SODIUM 100 MG/1
200 CAPSULE, LIQUID FILLED ORAL 2 TIMES DAILY PRN
Qty: 120 CAPSULE | Refills: 11 | Status: SHIPPED | OUTPATIENT
Start: 2024-09-30

## 2024-10-08 ENCOUNTER — TELEPHONE (OUTPATIENT)
Age: 89
End: 2024-10-08

## 2024-10-08 NOTE — PROGRESS NOTES
[facility] [place]  Medical Oncology at [place]  [phone]    Hematology / Oncology Consult    Reason for Visit:   Sloan Sarkar is a 89 y.o. male who is seen in follow up of iron deficiency anemia. Referred by Dr. Kulwinder Ross for evaluation of iron deficiency anemia.     History of Present Illness:   Sloan Sarkar is a 89 y.o. male who presents for evaluation of iron deficiency anemia.    He reports feeling okay today. He has Parkinson's dementia so daughter provides most of history.  He was admitted 6/13-6/23 for acute GI bleeding after seeking evaluation for dark diarrhea stools.  He had an severe allergic reaction to IV Venofer.  Diagnosed with PE and DVT during his admission, IVC filter placed but no anticoagulation due to bleeding. He takes Amitiza every other day for chronic constipation. No recurrence of diarrhea or bloody stool since d/c. Denies abdominal pain, melena or BRBPR since d/c.     He is accompanied by his daughter, Eva, today.      Interval History:  Patient here for follow up of iron deficiency anemia. He has Parkinson's dementia so daughter provides most of history.  He is taking iron supplements once daily. He suffers with constipation. He is on Amitiza, having a bowel movement at least once per week. Denies abdominal pain, melena or hematochezia. Has darks stools on the iron supplement. Diagnosed with PE/DVT during June 2024 hospital admission for GI bleeding. Due to GI bleeding, he had an IVC filter placed over starting on anticoagulation. Will have occasional exertional SOB. Denies leg or chest pain. Reports very low energy level. Not hydrating well.     He lives at home with his son and daughter in law.     Past Medical History:   Diagnosis Date    Cognitive impairment     Essential hypertension     Hyperlipidemia     Idiopathic peripheral neuropathy     Lacunar infarction (HCC)     Old lacunar infarcts with severe white matter disease per head CT of 11/4/2021    GUILLAUME

## 2024-10-08 NOTE — TELEPHONE ENCOUNTER
10/08/24 4:24 PM called daughter to advise pt get labs done before visit. She reports pt had labs done at PCPs, she will call to see if iron/ferritin/cbc completed, if so, then I will request results. If labs not done at PCP, pt will go later this week.

## 2024-10-09 ENCOUNTER — TELEPHONE (OUTPATIENT)
Age: 89
End: 2024-10-09

## 2024-10-09 NOTE — TELEPHONE ENCOUNTER
Patients daughter called and stated that the patient did have his labs done today at his PCP. She stated that the nurses said that they would send them over but still wanted to make the team aware.     # [phone]

## 2024-10-15 ENCOUNTER — OFFICE VISIT (OUTPATIENT)
Age: 89
End: 2024-10-15
Payer: MEDICARE

## 2024-10-15 VITALS
SYSTOLIC BLOOD PRESSURE: 134 MMHG | TEMPERATURE: 97.5 F | HEART RATE: 54 BPM | DIASTOLIC BLOOD PRESSURE: 80 MMHG | OXYGEN SATURATION: 99 % | HEIGHT: 67 IN | BODY MASS INDEX: 29.38 KG/M2

## 2024-10-15 DIAGNOSIS — K59.00 CONSTIPATION, UNSPECIFIED CONSTIPATION TYPE: ICD-10-CM

## 2024-10-15 DIAGNOSIS — Z86.2 HISTORY OF IRON DEFICIENCY ANEMIA: Primary | ICD-10-CM

## 2024-10-15 DIAGNOSIS — Z86.711 HISTORY OF PULMONARY EMBOLUS (PE): ICD-10-CM

## 2024-10-15 DIAGNOSIS — Z87.19 HISTORY OF GI BLEED: ICD-10-CM

## 2024-10-15 DIAGNOSIS — Z86.718 HISTORY OF DVT OF LOWER EXTREMITY: ICD-10-CM

## 2024-10-15 PROCEDURE — 99213 OFFICE O/P EST LOW 20 MIN: CPT | Performed by: NURSE PRACTITIONER

## 2024-10-15 RX ORDER — TAMSULOSIN HYDROCHLORIDE 0.4 MG/1
0.4 CAPSULE ORAL DAILY
COMMUNITY
Start: 2024-10-07

## 2024-10-15 RX ORDER — DONEPEZIL HYDROCHLORIDE 10 MG/1
10 TABLET, FILM COATED ORAL NIGHTLY
COMMUNITY

## 2024-10-15 NOTE — PROGRESS NOTES
Sloan Sarkar is a 89 y.o. male here for follow up of anemia. Patient with no complaints of pain at this time.

## 2024-10-16 ENCOUNTER — TELEPHONE (OUTPATIENT)
Age: 89
End: 2024-10-16

## 2024-10-16 NOTE — TELEPHONE ENCOUNTER
10/16/24 3:37 PM   Called daughter to discuss IVC filter. Left VM to return call. I recommend patient leave IVC filter in place. Given his advanced age and severity of GI bleed without known cause, he is at risk for another GI bleed, therefor he is not a candidate for anticoagulation. Given his sedentary lifestyle and history of blood clots, he is at risk for recurrence of a blood clot, so I recommend leaving IVC filter in place for now.

## 2024-10-31 ENCOUNTER — HOSPITAL ENCOUNTER (OUTPATIENT)
Facility: HOSPITAL | Age: 89
Discharge: HOME | End: 2024-11-03
Attending: FAMILY MEDICINE
Payer: MEDICARE

## 2024-10-31 DIAGNOSIS — R06.02 SOB (SHORTNESS OF BREATH): ICD-10-CM

## 2024-10-31 PROCEDURE — 71250 CT THORAX DX C-: CPT

## 2024-12-31 ENCOUNTER — TELEPHONE (OUTPATIENT)
Age: 88
End: 2024-12-31

## 2025-01-16 NOTE — TELEPHONE ENCOUNTER
Called patient to set up new patient appt. No answer. Left      Np / papito / Kulwinder delgado         Schedule with Felisha on 7/16 or 7/17      I spoke with Sultana RN at [facility].  I let her know that we will need to see Angelo to remove his staples and evaluate his surgical incision.  I asked if patient is able to be transferred by  or if he will need a gurney.  She will check with his RN.  I let her know that if he needs to be transferred by gurney, he will need to go to [facility] as [place] [facility] has difficulty with getting gurneys in the rooms here.  She will call me back, she has my direct desk number.

## 2025-03-03 ENCOUNTER — TELEPHONE (OUTPATIENT)
Age: 89
End: 2025-03-03

## 2025-03-03 DIAGNOSIS — Z86.2 HISTORY OF IRON DEFICIENCY ANEMIA: ICD-10-CM

## 2025-03-03 NOTE — TELEPHONE ENCOUNTER
Pt daughter called in requesting to reschedule the pt appt. Pt has an appt scheduled on the same day. Pt is rescheduled to come in on 4/1.  FYI

## 2025-04-01 ENCOUNTER — TELEPHONE (OUTPATIENT)
Age: 89
End: 2025-04-01

## 2025-04-01 NOTE — TELEPHONE ENCOUNTER
Patients daughter called and stated that she needed to reschedule the patients appt due to him not feeling well this morning. Pt rescheduled to 4/29 at 9.

## 2025-05-12 ENCOUNTER — TELEPHONE (OUTPATIENT)
Age: 89
End: 2025-05-12

## (undated) DEVICE — SET ADMIN 16ML TBNG L100IN 2 Y INJ SITE IV PIGGY BK DISP (ORDER IN MULIPLES OF 48)

## (undated) DEVICE — SYRINGE MEDICAL 3ML CLEAR PLASTIC STANDARD NON CONTROL LUERLOCK TIP DISPOSABLE

## (undated) DEVICE — KIT COLON W/ 1.1OZ LUB AND 2 END

## (undated) DEVICE — BLUNTFILL WITH FILTER: Brand: MONOJECT

## (undated) DEVICE — ELECTRODE,RADIOTRANSLUCENT,FOAM,3PK: Brand: MEDLINE

## (undated) DEVICE — BLUNTFILL: Brand: MONOJECT

## (undated) DEVICE — CATHETER IV 22GA L1IN OD0.8382-0.9144MM ID0.6096-0.6858MM 382523

## (undated) DEVICE — SOLIDIFIER FLD 2OZ 1500CC N DISINF IN BTL DISP SAFESORB

## (undated) DEVICE — BITEBLOCK ENDOSCP 60FR MAXI WHT POLYETH STURDY W/ VELC WVN

## (undated) DEVICE — 1200 GUARD II KIT W/5MM TUBE W/O VAC TUBE: Brand: GUARDIAN

## (undated) DEVICE — SYRINGE MED 5ML STD CLR PLAS LUERLOCK TIP N CTRL DISP

## (undated) DEVICE — CANNULA CUSH AD W/ 14FT TBG

## (undated) DEVICE — IV STRT KT 3282] LSL INDUSTRIES INC]